# Patient Record
Sex: MALE | Race: WHITE | NOT HISPANIC OR LATINO | Employment: UNEMPLOYED | URBAN - METROPOLITAN AREA
[De-identification: names, ages, dates, MRNs, and addresses within clinical notes are randomized per-mention and may not be internally consistent; named-entity substitution may affect disease eponyms.]

---

## 2017-11-10 ENCOUNTER — CLINICAL SUPPORT (OUTPATIENT)
Dept: OCCUPATIONAL MEDICINE | Facility: CLINIC | Age: 27
End: 2017-11-10

## 2017-11-10 DIAGNOSIS — Z00.00 PHYSICAL EXAM: Primary | ICD-10-CM

## 2017-11-10 LAB
BILIRUB UR QL STRIP: NORMAL
CTP QC/QA: YES
GLUCOSE UR QL STRIP: NORMAL
KETONES UR QL STRIP: NORMAL
LEUKOCYTE ESTERASE UR QL STRIP: NORMAL
PH, POC UA: NORMAL (ref 5–8)
POC 5 PANEL DRUG SCREEN: NEGATIVE
POC BLOOD, URINE: NEGATIVE
POC BREATH ALCOHOL: NEGATIVE
POC NITRATES, URINE: NORMAL
PROT UR QL STRIP: NORMAL
SP GR UR STRIP: NORMAL (ref 1–1.03)
UROBILINOGEN UR STRIP-ACNC: NORMAL (ref 0.3–2.2)

## 2017-11-10 PROCEDURE — 92552 PURE TONE AUDIOMETRY AIR: CPT | Mod: S$GLB,,, | Performed by: PREVENTIVE MEDICINE

## 2017-11-10 PROCEDURE — 80305 DRUG TEST PRSMV DIR OPT OBS: CPT | Mod: S$GLB,,, | Performed by: PREVENTIVE MEDICINE

## 2017-11-10 PROCEDURE — 82075 ASSAY OF BREATH ETHANOL: CPT | Mod: S$GLB,,, | Performed by: NURSE PRACTITIONER

## 2017-11-10 PROCEDURE — 80305 DRUG TEST PRSMV DIR OPT OBS: CPT | Mod: QW,S$GLB,, | Performed by: NURSE PRACTITIONER

## 2017-11-10 PROCEDURE — 94010 BREATHING CAPACITY TEST: CPT | Mod: S$GLB,,, | Performed by: PREVENTIVE MEDICINE

## 2017-11-10 PROCEDURE — 99499 UNLISTED E&M SERVICE: CPT | Mod: S$GLB,,, | Performed by: PREVENTIVE MEDICINE

## 2019-03-14 ENCOUNTER — OCCUPATIONAL HEALTH (OUTPATIENT)
Dept: URGENT CARE | Facility: CLINIC | Age: 29
End: 2019-03-14

## 2019-03-14 DIAGNOSIS — Z02.89 ENCOUNTER FOR PHYSICAL EXAMINATION RELATED TO EMPLOYMENT: Primary | ICD-10-CM

## 2019-03-14 PROCEDURE — 99499 PHYSICAL, BASIC COMPLEXITY: ICD-10-PCS | Mod: S$GLB,,, | Performed by: NURSE PRACTITIONER

## 2019-03-14 PROCEDURE — 71045 X-RAY EXAM CHEST 1 VIEW: CPT | Mod: FY,S$GLB,, | Performed by: RADIOLOGY

## 2019-03-14 PROCEDURE — 94010 PULMONARY FUNCTION SCREENING (OCC MED PHYSICALS): ICD-10-PCS | Mod: S$GLB,,, | Performed by: NURSE PRACTITIONER

## 2019-03-14 PROCEDURE — 99499 UNLISTED E&M SERVICE: CPT | Mod: S$GLB,,, | Performed by: NURSE PRACTITIONER

## 2019-03-14 PROCEDURE — 94010 BREATHING CAPACITY TEST: CPT | Mod: S$GLB,,, | Performed by: NURSE PRACTITIONER

## 2019-03-14 PROCEDURE — 99080 OSHA QUESTIONNAIRE: ICD-10-PCS | Mod: S$GLB,,, | Performed by: NURSE PRACTITIONER

## 2019-03-14 PROCEDURE — 97750 LIFT TEST: ICD-10-PCS | Mod: S$GLB,,, | Performed by: NURSE PRACTITIONER

## 2019-03-14 PROCEDURE — 99080 SPECIAL REPORTS OR FORMS: CPT | Mod: S$GLB,,, | Performed by: NURSE PRACTITIONER

## 2019-03-14 PROCEDURE — 89240 OOH PANEL 2 (CMP, CBC W/DIFF, UA, MICR): ICD-10-PCS | Mod: S$GLB,,, | Performed by: NURSE PRACTITIONER

## 2019-03-14 PROCEDURE — 97750 PHYSICAL PERFORMANCE TEST: CPT | Mod: S$GLB,,, | Performed by: NURSE PRACTITIONER

## 2019-03-14 PROCEDURE — 71045 XR CHEST 1 VIEW: ICD-10-PCS | Mod: FY,S$GLB,, | Performed by: RADIOLOGY

## 2019-03-14 PROCEDURE — 95832 STRENGTH & FLEX: CPT | Mod: S$GLB,,, | Performed by: NURSE PRACTITIONER

## 2019-03-14 PROCEDURE — 99002 N95 MASK FIT: ICD-10-PCS | Mod: S$GLB,,, | Performed by: NURSE PRACTITIONER

## 2019-03-14 PROCEDURE — 72110 X-RAY EXAM L-2 SPINE 4/>VWS: CPT | Mod: FY,S$GLB,, | Performed by: RADIOLOGY

## 2019-03-14 PROCEDURE — 99002 DEVICE HANDLING PHYS/QHP: CPT | Mod: S$GLB,,, | Performed by: NURSE PRACTITIONER

## 2019-03-14 PROCEDURE — 72110 XR LUMBAR SPINE COMPLETE 5 VIEW: ICD-10-PCS | Mod: FY,S$GLB,, | Performed by: RADIOLOGY

## 2019-03-14 PROCEDURE — 95832 STRENGTH & FLEX: ICD-10-PCS | Mod: S$GLB,,, | Performed by: NURSE PRACTITIONER

## 2019-03-14 PROCEDURE — 89240 UNLISTED MISC PATH TEST: CPT | Mod: S$GLB,,, | Performed by: NURSE PRACTITIONER

## 2022-02-04 ENCOUNTER — HISTORICAL (OUTPATIENT)
Dept: ADMINISTRATIVE | Facility: HOSPITAL | Age: 32
End: 2022-02-04

## 2022-04-10 ENCOUNTER — HISTORICAL (OUTPATIENT)
Dept: ADMINISTRATIVE | Facility: HOSPITAL | Age: 32
End: 2022-04-10

## 2022-04-26 VITALS
WEIGHT: 259.5 LBS | BODY MASS INDEX: 36.33 KG/M2 | HEIGHT: 71 IN | OXYGEN SATURATION: 95 % | SYSTOLIC BLOOD PRESSURE: 134 MMHG | DIASTOLIC BLOOD PRESSURE: 78 MMHG

## 2022-07-31 ENCOUNTER — ANESTHESIA (OUTPATIENT)
Dept: SURGERY | Facility: HOSPITAL | Age: 32
DRG: 419 | End: 2022-07-31

## 2022-07-31 ENCOUNTER — HOSPITAL ENCOUNTER (INPATIENT)
Facility: HOSPITAL | Age: 32
LOS: 1 days | Discharge: HOME | DRG: 419 | End: 2022-08-01
Attending: EMERGENCY MEDICINE | Admitting: SURGERY

## 2022-07-31 ENCOUNTER — ANESTHESIA EVENT (OUTPATIENT)
Dept: SURGERY | Facility: HOSPITAL | Age: 32
DRG: 419 | End: 2022-07-31

## 2022-07-31 DIAGNOSIS — R10.9 ABDOMINAL PAIN: ICD-10-CM

## 2022-07-31 DIAGNOSIS — K80.00 CALCULUS OF GALLBLADDER WITH ACUTE CHOLECYSTITIS WITHOUT OBSTRUCTION: Primary | ICD-10-CM

## 2022-07-31 DIAGNOSIS — K80.71 CALCULUS OF GALLBLADDER AND BILE DUCT WITH OBSTRUCTION WITHOUT CHOLECYSTITIS: ICD-10-CM

## 2022-07-31 LAB
ALBUMIN SERPL-MCNC: 4.1 GM/DL (ref 3.5–5)
ALBUMIN/GLOB SERPL: 1.4 RATIO (ref 1.1–2)
ALP SERPL-CCNC: 75 UNIT/L (ref 40–150)
ALT SERPL-CCNC: 34 UNIT/L (ref 0–55)
APPEARANCE UR: CLEAR
AST SERPL-CCNC: 24 UNIT/L (ref 5–34)
BACTERIA #/AREA URNS AUTO: NORMAL /HPF
BASOPHILS # BLD AUTO: 0.03 X10(3)/MCL (ref 0–0.2)
BASOPHILS NFR BLD AUTO: 0.2 %
BILIRUB UR QL STRIP.AUTO: NEGATIVE MG/DL
BILIRUBIN DIRECT+TOT PNL SERPL-MCNC: 0.8 MG/DL
BUN SERPL-MCNC: 8.7 MG/DL (ref 8.9–20.6)
CALCIUM SERPL-MCNC: 9.3 MG/DL (ref 8.4–10.2)
CHLORIDE SERPL-SCNC: 105 MMOL/L (ref 98–107)
CO2 SERPL-SCNC: 23 MMOL/L (ref 22–29)
COLOR UR AUTO: YELLOW
CREAT SERPL-MCNC: 0.87 MG/DL (ref 0.73–1.18)
EOSINOPHIL # BLD AUTO: 0.01 X10(3)/MCL (ref 0–0.9)
EOSINOPHIL NFR BLD AUTO: 0.1 %
ERYTHROCYTE [DISTWIDTH] IN BLOOD BY AUTOMATED COUNT: 12 % (ref 11.5–17)
FLUAV AG UPPER RESP QL IA.RAPID: NOT DETECTED
FLUBV AG UPPER RESP QL IA.RAPID: NOT DETECTED
GLOBULIN SER-MCNC: 3 GM/DL (ref 2.4–3.5)
GLUCOSE SERPL-MCNC: 130 MG/DL (ref 74–100)
GLUCOSE UR QL STRIP.AUTO: ABNORMAL MG/DL
HCT VFR BLD AUTO: 48.3 % (ref 42–52)
HGB BLD-MCNC: 15.8 GM/DL (ref 14–18)
IMM GRANULOCYTES # BLD AUTO: 0.08 X10(3)/MCL (ref 0–0.04)
IMM GRANULOCYTES NFR BLD AUTO: 0.5 %
KETONES UR QL STRIP.AUTO: NEGATIVE MG/DL
LEUKOCYTE ESTERASE UR QL STRIP.AUTO: NEGATIVE UNIT/L
LIPASE SERPL-CCNC: 15 U/L
LYMPHOCYTES # BLD AUTO: 1.33 X10(3)/MCL (ref 0.6–4.6)
LYMPHOCYTES NFR BLD AUTO: 8.1 %
MCH RBC QN AUTO: 29.3 PG (ref 27–31)
MCHC RBC AUTO-ENTMCNC: 32.7 MG/DL (ref 33–36)
MCV RBC AUTO: 89.6 FL (ref 80–94)
MONOCYTES # BLD AUTO: 0.97 X10(3)/MCL (ref 0.1–1.3)
MONOCYTES NFR BLD AUTO: 5.9 %
NEUTROPHILS # BLD AUTO: 14 X10(3)/MCL (ref 2.1–9.2)
NEUTROPHILS NFR BLD AUTO: 85.2 %
NITRITE UR QL STRIP.AUTO: NEGATIVE
NRBC BLD AUTO-RTO: 0 %
PH UR STRIP.AUTO: 7.5 [PH]
PLATELET # BLD AUTO: 245 X10(3)/MCL (ref 130–400)
PMV BLD AUTO: 10.3 FL (ref 7.4–10.4)
POTASSIUM SERPL-SCNC: 3.6 MMOL/L (ref 3.5–5.1)
PROT SERPL-MCNC: 7.1 GM/DL (ref 6.4–8.3)
PROT UR QL STRIP.AUTO: NEGATIVE MG/DL
RBC # BLD AUTO: 5.39 X10(6)/MCL (ref 4.7–6.1)
RBC #/AREA URNS AUTO: <5 /HPF
RBC UR QL AUTO: NEGATIVE UNIT/L
SARS-COV-2 RNA RESP QL NAA+PROBE: NOT DETECTED
SODIUM SERPL-SCNC: 139 MMOL/L (ref 136–145)
SP GR UR STRIP.AUTO: 1.02 (ref 1–1.03)
SQUAMOUS #/AREA URNS AUTO: <5 /HPF
TROPONIN I SERPL-MCNC: <0.01 NG/ML (ref 0–0.04)
UROBILINOGEN UR STRIP-ACNC: 1 MG/DL
WBC # SPEC AUTO: 16.4 X10(3)/MCL (ref 4.5–11.5)
WBC #/AREA URNS AUTO: <5 /HPF

## 2022-07-31 PROCEDURE — 71000016 HC POSTOP RECOV ADDL HR: Performed by: SURGERY

## 2022-07-31 PROCEDURE — 25000003 PHARM REV CODE 250

## 2022-07-31 PROCEDURE — 99285 EMERGENCY DEPT VISIT HI MDM: CPT | Mod: 25

## 2022-07-31 PROCEDURE — 83690 ASSAY OF LIPASE: CPT | Performed by: EMERGENCY MEDICINE

## 2022-07-31 PROCEDURE — 96361 HYDRATE IV INFUSION ADD-ON: CPT

## 2022-07-31 PROCEDURE — 37000008 HC ANESTHESIA 1ST 15 MINUTES: Performed by: SURGERY

## 2022-07-31 PROCEDURE — 25500020 PHARM REV CODE 255: Performed by: EMERGENCY MEDICINE

## 2022-07-31 PROCEDURE — 63600175 PHARM REV CODE 636 W HCPCS

## 2022-07-31 PROCEDURE — 71000015 HC POSTOP RECOV 1ST HR: Performed by: SURGERY

## 2022-07-31 PROCEDURE — 71000033 HC RECOVERY, INTIAL HOUR: Performed by: SURGERY

## 2022-07-31 PROCEDURE — 36000709 HC OR TIME LEV III EA ADD 15 MIN: Performed by: SURGERY

## 2022-07-31 PROCEDURE — 93005 ELECTROCARDIOGRAM TRACING: CPT

## 2022-07-31 PROCEDURE — 93010 ELECTROCARDIOGRAM REPORT: CPT | Mod: ,,, | Performed by: INTERNAL MEDICINE

## 2022-07-31 PROCEDURE — 25000003 PHARM REV CODE 250: Performed by: SURGERY

## 2022-07-31 PROCEDURE — 87636 SARSCOV2 & INF A&B AMP PRB: CPT | Performed by: EMERGENCY MEDICINE

## 2022-07-31 PROCEDURE — 11000001 HC ACUTE MED/SURG PRIVATE ROOM

## 2022-07-31 PROCEDURE — 71000039 HC RECOVERY, EACH ADD'L HOUR: Performed by: SURGERY

## 2022-07-31 PROCEDURE — 63600175 PHARM REV CODE 636 W HCPCS: Performed by: SURGERY

## 2022-07-31 PROCEDURE — 96375 TX/PRO/DX INJ NEW DRUG ADDON: CPT

## 2022-07-31 PROCEDURE — 96374 THER/PROPH/DIAG INJ IV PUSH: CPT

## 2022-07-31 PROCEDURE — 63600175 PHARM REV CODE 636 W HCPCS: Performed by: EMERGENCY MEDICINE

## 2022-07-31 PROCEDURE — 85025 COMPLETE CBC W/AUTO DIFF WBC: CPT | Performed by: EMERGENCY MEDICINE

## 2022-07-31 PROCEDURE — 36000708 HC OR TIME LEV III 1ST 15 MIN: Performed by: SURGERY

## 2022-07-31 PROCEDURE — 84484 ASSAY OF TROPONIN QUANT: CPT | Performed by: EMERGENCY MEDICINE

## 2022-07-31 PROCEDURE — 36415 COLL VENOUS BLD VENIPUNCTURE: CPT | Performed by: EMERGENCY MEDICINE

## 2022-07-31 PROCEDURE — 27201423 OPTIME MED/SURG SUP & DEVICES STERILE SUPPLY: Performed by: SURGERY

## 2022-07-31 PROCEDURE — 25000003 PHARM REV CODE 250: Performed by: EMERGENCY MEDICINE

## 2022-07-31 PROCEDURE — 93010 EKG 12-LEAD: ICD-10-PCS | Mod: ,,, | Performed by: INTERNAL MEDICINE

## 2022-07-31 PROCEDURE — 80053 COMPREHEN METABOLIC PANEL: CPT | Performed by: EMERGENCY MEDICINE

## 2022-07-31 PROCEDURE — 96376 TX/PRO/DX INJ SAME DRUG ADON: CPT

## 2022-07-31 PROCEDURE — 81001 URINALYSIS AUTO W/SCOPE: CPT | Performed by: EMERGENCY MEDICINE

## 2022-07-31 PROCEDURE — 37000009 HC ANESTHESIA EA ADD 15 MINS: Performed by: SURGERY

## 2022-07-31 RX ORDER — LIDOCAINE HYDROCHLORIDE 20 MG/ML
INJECTION, SOLUTION EPIDURAL; INFILTRATION; INTRACAUDAL; PERINEURAL
Status: DISCONTINUED | OUTPATIENT
Start: 2022-07-31 | End: 2022-07-31

## 2022-07-31 RX ORDER — BUPIVACAINE HYDROCHLORIDE 5 MG/ML
INJECTION, SOLUTION EPIDURAL; INTRACAUDAL
Status: DISCONTINUED | OUTPATIENT
Start: 2022-07-31 | End: 2022-07-31 | Stop reason: HOSPADM

## 2022-07-31 RX ORDER — SUCCINYLCHOLINE CHLORIDE 20 MG/ML
INJECTION INTRAMUSCULAR; INTRAVENOUS
Status: DISCONTINUED | OUTPATIENT
Start: 2022-07-31 | End: 2022-07-31

## 2022-07-31 RX ORDER — PROMETHAZINE HYDROCHLORIDE 25 MG/1
25 SUPPOSITORY RECTAL EVERY 6 HOURS PRN
Status: DISCONTINUED | OUTPATIENT
Start: 2022-07-31 | End: 2022-07-31

## 2022-07-31 RX ORDER — OXYCODONE HYDROCHLORIDE 5 MG/1
10 TABLET ORAL EVERY 4 HOURS PRN
Status: DISCONTINUED | OUTPATIENT
Start: 2022-07-31 | End: 2022-08-01 | Stop reason: HOSPADM

## 2022-07-31 RX ORDER — ONDANSETRON 2 MG/ML
4 INJECTION INTRAMUSCULAR; INTRAVENOUS EVERY 8 HOURS PRN
Status: DISCONTINUED | OUTPATIENT
Start: 2022-07-31 | End: 2022-08-01 | Stop reason: HOSPADM

## 2022-07-31 RX ORDER — DIPHENHYDRAMINE HYDROCHLORIDE 50 MG/ML
25 INJECTION INTRAMUSCULAR; INTRAVENOUS EVERY 6 HOURS PRN
Status: CANCELLED | OUTPATIENT
Start: 2022-07-31

## 2022-07-31 RX ORDER — OXYCODONE HYDROCHLORIDE 5 MG/1
5 TABLET ORAL EVERY 4 HOURS PRN
Status: DISCONTINUED | OUTPATIENT
Start: 2022-07-31 | End: 2022-08-01 | Stop reason: HOSPADM

## 2022-07-31 RX ORDER — SODIUM CHLORIDE, SODIUM GLUCONATE, SODIUM ACETATE, POTASSIUM CHLORIDE AND MAGNESIUM CHLORIDE 30; 37; 368; 526; 502 MG/100ML; MG/100ML; MG/100ML; MG/100ML; MG/100ML
1000 INJECTION, SOLUTION INTRAVENOUS CONTINUOUS
Status: CANCELLED | OUTPATIENT
Start: 2022-07-31 | End: 2022-08-30

## 2022-07-31 RX ORDER — PROPOFOL 10 MG/ML
VIAL (ML) INTRAVENOUS
Status: DISCONTINUED | OUTPATIENT
Start: 2022-07-31 | End: 2022-07-31

## 2022-07-31 RX ORDER — MORPHINE SULFATE 4 MG/ML
4 INJECTION, SOLUTION INTRAMUSCULAR; INTRAVENOUS
Status: COMPLETED | OUTPATIENT
Start: 2022-07-31 | End: 2022-07-31

## 2022-07-31 RX ORDER — MIDAZOLAM HYDROCHLORIDE 1 MG/ML
INJECTION INTRAMUSCULAR; INTRAVENOUS
Status: DISCONTINUED | OUTPATIENT
Start: 2022-07-31 | End: 2022-07-31

## 2022-07-31 RX ORDER — MEPERIDINE HYDROCHLORIDE 25 MG/ML
12.5 INJECTION INTRAMUSCULAR; INTRAVENOUS; SUBCUTANEOUS EVERY 10 MIN PRN
Status: CANCELLED | OUTPATIENT
Start: 2022-07-31 | End: 2022-08-01

## 2022-07-31 RX ORDER — IPRATROPIUM BROMIDE AND ALBUTEROL SULFATE 2.5; .5 MG/3ML; MG/3ML
3 SOLUTION RESPIRATORY (INHALATION)
Status: CANCELLED | OUTPATIENT
Start: 2022-07-31

## 2022-07-31 RX ORDER — MIDAZOLAM HYDROCHLORIDE 1 MG/ML
2 INJECTION INTRAMUSCULAR; INTRAVENOUS
Status: CANCELLED | OUTPATIENT
Start: 2022-07-31 | End: 2022-07-31

## 2022-07-31 RX ORDER — KETOROLAC TROMETHAMINE 30 MG/ML
15 INJECTION, SOLUTION INTRAMUSCULAR; INTRAVENOUS EVERY 6 HOURS
Status: DISCONTINUED | OUTPATIENT
Start: 2022-07-31 | End: 2022-07-31

## 2022-07-31 RX ORDER — ONDANSETRON 2 MG/ML
4 INJECTION INTRAMUSCULAR; INTRAVENOUS EVERY 12 HOURS PRN
Status: DISCONTINUED | OUTPATIENT
Start: 2022-07-31 | End: 2022-07-31

## 2022-07-31 RX ORDER — SODIUM CITRATE AND CITRIC ACID MONOHYDRATE 334; 500 MG/5ML; MG/5ML
30 SOLUTION ORAL ONCE
Status: CANCELLED | OUTPATIENT
Start: 2022-07-31 | End: 2022-07-31

## 2022-07-31 RX ORDER — ROCURONIUM BROMIDE 10 MG/ML
INJECTION, SOLUTION INTRAVENOUS
Status: DISCONTINUED | OUTPATIENT
Start: 2022-07-31 | End: 2022-07-31

## 2022-07-31 RX ORDER — KETOROLAC TROMETHAMINE 30 MG/ML
INJECTION, SOLUTION INTRAMUSCULAR; INTRAVENOUS
Status: DISCONTINUED | OUTPATIENT
Start: 2022-07-31 | End: 2022-07-31

## 2022-07-31 RX ORDER — SODIUM CHLORIDE 0.9 % (FLUSH) 0.9 %
10 SYRINGE (ML) INJECTION
Status: DISCONTINUED | OUTPATIENT
Start: 2022-07-31 | End: 2022-07-31

## 2022-07-31 RX ORDER — TALC
6 POWDER (GRAM) TOPICAL NIGHTLY PRN
Status: DISCONTINUED | OUTPATIENT
Start: 2022-07-31 | End: 2022-07-31

## 2022-07-31 RX ORDER — ONDANSETRON 2 MG/ML
INJECTION INTRAMUSCULAR; INTRAVENOUS
Status: DISCONTINUED | OUTPATIENT
Start: 2022-07-31 | End: 2022-07-31

## 2022-07-31 RX ORDER — HYDROMORPHONE HYDROCHLORIDE 2 MG/ML
0.2 INJECTION, SOLUTION INTRAMUSCULAR; INTRAVENOUS; SUBCUTANEOUS EVERY 5 MIN PRN
Status: CANCELLED | OUTPATIENT
Start: 2022-07-31

## 2022-07-31 RX ORDER — DEXAMETHASONE SODIUM PHOSPHATE 4 MG/ML
INJECTION, SOLUTION INTRA-ARTICULAR; INTRALESIONAL; INTRAMUSCULAR; INTRAVENOUS; SOFT TISSUE
Status: DISCONTINUED | OUTPATIENT
Start: 2022-07-31 | End: 2022-07-31

## 2022-07-31 RX ORDER — PIPERACILLIN SODIUM, TAZOBACTAM SODIUM 4; .5 G/20ML; G/20ML
INJECTION, POWDER, LYOPHILIZED, FOR SOLUTION INTRAVENOUS
Status: DISPENSED
Start: 2022-07-31 | End: 2022-07-31

## 2022-07-31 RX ORDER — ACETAMINOPHEN 325 MG/1
650 TABLET ORAL EVERY 8 HOURS PRN
Status: DISCONTINUED | OUTPATIENT
Start: 2022-07-31 | End: 2022-07-31

## 2022-07-31 RX ORDER — LIDOCAINE HYDROCHLORIDE 10 MG/ML
1 INJECTION, SOLUTION EPIDURAL; INFILTRATION; INTRACAUDAL; PERINEURAL ONCE
Status: CANCELLED | OUTPATIENT
Start: 2022-07-31 | End: 2022-07-31

## 2022-07-31 RX ORDER — CEFAZOLIN SODIUM 1 G/3ML
INJECTION, POWDER, FOR SOLUTION INTRAMUSCULAR; INTRAVENOUS
Status: DISCONTINUED | OUTPATIENT
Start: 2022-07-31 | End: 2022-07-31

## 2022-07-31 RX ORDER — ENOXAPARIN SODIUM 100 MG/ML
40 INJECTION SUBCUTANEOUS EVERY 24 HOURS
Status: DISCONTINUED | OUTPATIENT
Start: 2022-07-31 | End: 2022-08-01 | Stop reason: HOSPADM

## 2022-07-31 RX ORDER — PROCHLORPERAZINE EDISYLATE 5 MG/ML
5 INJECTION INTRAMUSCULAR; INTRAVENOUS EVERY 30 MIN PRN
Status: CANCELLED | OUTPATIENT
Start: 2022-07-31

## 2022-07-31 RX ORDER — ACETAMINOPHEN 10 MG/ML
INJECTION, SOLUTION INTRAVENOUS
Status: DISCONTINUED | OUTPATIENT
Start: 2022-07-31 | End: 2022-07-31

## 2022-07-31 RX ORDER — ONDANSETRON 2 MG/ML
4 INJECTION INTRAMUSCULAR; INTRAVENOUS
Status: COMPLETED | OUTPATIENT
Start: 2022-07-31 | End: 2022-07-31

## 2022-07-31 RX ORDER — KETOROLAC TROMETHAMINE 30 MG/ML
15 INJECTION, SOLUTION INTRAMUSCULAR; INTRAVENOUS EVERY 6 HOURS
Status: DISCONTINUED | OUTPATIENT
Start: 2022-07-31 | End: 2022-08-01 | Stop reason: HOSPADM

## 2022-07-31 RX ORDER — ONDANSETRON 2 MG/ML
4 INJECTION INTRAMUSCULAR; INTRAVENOUS DAILY PRN
Status: CANCELLED | OUTPATIENT
Start: 2022-07-31

## 2022-07-31 RX ORDER — MORPHINE SULFATE 10 MG/ML
2 INJECTION INTRAMUSCULAR; INTRAVENOUS; SUBCUTANEOUS EVERY 4 HOURS PRN
Status: DISCONTINUED | OUTPATIENT
Start: 2022-07-31 | End: 2022-07-31

## 2022-07-31 RX ORDER — ACETAMINOPHEN 325 MG/1
650 TABLET ORAL EVERY 6 HOURS
Status: DISCONTINUED | OUTPATIENT
Start: 2022-07-31 | End: 2022-08-01 | Stop reason: HOSPADM

## 2022-07-31 RX ORDER — HYDROMORPHONE HYDROCHLORIDE 2 MG/ML
1 INJECTION, SOLUTION INTRAMUSCULAR; INTRAVENOUS; SUBCUTANEOUS
Status: COMPLETED | OUTPATIENT
Start: 2022-07-31 | End: 2022-07-31

## 2022-07-31 RX ORDER — SODIUM CHLORIDE 9 MG/ML
INJECTION, SOLUTION INTRAVENOUS CONTINUOUS
Status: DISCONTINUED | OUTPATIENT
Start: 2022-07-31 | End: 2022-08-01 | Stop reason: HOSPADM

## 2022-07-31 RX ORDER — KETOROLAC TROMETHAMINE 30 MG/ML
30 INJECTION, SOLUTION INTRAMUSCULAR; INTRAVENOUS ONCE
Status: DISCONTINUED | OUTPATIENT
Start: 2022-07-31 | End: 2022-07-31

## 2022-07-31 RX ORDER — FENTANYL CITRATE 50 UG/ML
INJECTION, SOLUTION INTRAMUSCULAR; INTRAVENOUS
Status: DISCONTINUED | OUTPATIENT
Start: 2022-07-31 | End: 2022-07-31

## 2022-07-31 RX ADMIN — ROCURONIUM BROMIDE 35 MG: 10 SOLUTION INTRAVENOUS at 12:07

## 2022-07-31 RX ADMIN — SODIUM CHLORIDE: 9 INJECTION, SOLUTION INTRAVENOUS at 09:07

## 2022-07-31 RX ADMIN — ENOXAPARIN SODIUM 40 MG: 100 INJECTION SUBCUTANEOUS at 06:07

## 2022-07-31 RX ADMIN — KETOROLAC TROMETHAMINE 30 MG: 30 INJECTION, SOLUTION INTRAMUSCULAR at 01:07

## 2022-07-31 RX ADMIN — ONDANSETRON 4 MG: 2 INJECTION INTRAMUSCULAR; INTRAVENOUS at 02:07

## 2022-07-31 RX ADMIN — PROPOFOL 200 MG: 10 INJECTION, EMULSION INTRAVENOUS at 11:07

## 2022-07-31 RX ADMIN — LIDOCAINE HYDROCHLORIDE 50 MG: 20 INJECTION, SOLUTION EPIDURAL; INFILTRATION; INTRACAUDAL; PERINEURAL at 11:07

## 2022-07-31 RX ADMIN — GLYCOPYRROLATE 0.2 MG: 0.2 INJECTION, SOLUTION INTRAMUSCULAR; INTRAVENOUS at 11:07

## 2022-07-31 RX ADMIN — ROCURONIUM BROMIDE 10 MG: 10 SOLUTION INTRAVENOUS at 12:07

## 2022-07-31 RX ADMIN — FENTANYL CITRATE 100 MCG: 50 INJECTION, SOLUTION INTRAMUSCULAR; INTRAVENOUS at 11:07

## 2022-07-31 RX ADMIN — ONDANSETRON 4 MG: 2 INJECTION INTRAMUSCULAR; INTRAVENOUS at 01:07

## 2022-07-31 RX ADMIN — IOPAMIDOL 100 ML: 755 INJECTION, SOLUTION INTRAVENOUS at 04:07

## 2022-07-31 RX ADMIN — ACETAMINOPHEN 325MG 650 MG: 325 TABLET ORAL at 06:07

## 2022-07-31 RX ADMIN — ACETAMINOPHEN 1000 MG: 10 INJECTION, SOLUTION INTRAVENOUS at 12:07

## 2022-07-31 RX ADMIN — DEXAMETHASONE SODIUM PHOSPHATE 4 MG: 4 INJECTION, SOLUTION INTRA-ARTICULAR; INTRALESIONAL; INTRAMUSCULAR; INTRAVENOUS; SOFT TISSUE at 12:07

## 2022-07-31 RX ADMIN — SODIUM CHLORIDE 1000 ML: 9 INJECTION, SOLUTION INTRAVENOUS at 02:07

## 2022-07-31 RX ADMIN — SUCCINYLCHOLINE CHLORIDE 160 MG: 20 INJECTION, SOLUTION INTRAMUSCULAR; INTRAVENOUS at 11:07

## 2022-07-31 RX ADMIN — FENTANYL CITRATE 50 MCG: 50 INJECTION, SOLUTION INTRAMUSCULAR; INTRAVENOUS at 01:07

## 2022-07-31 RX ADMIN — MORPHINE SULFATE 4 MG: 4 INJECTION INTRAVENOUS at 02:07

## 2022-07-31 RX ADMIN — MORPHINE SULFATE 4 MG: 4 INJECTION INTRAVENOUS at 08:07

## 2022-07-31 RX ADMIN — SODIUM CHLORIDE, SODIUM GLUCONATE, SODIUM ACETATE, POTASSIUM CHLORIDE AND MAGNESIUM CHLORIDE: 526; 502; 368; 37; 30 INJECTION, SOLUTION INTRAVENOUS at 11:07

## 2022-07-31 RX ADMIN — ACETAMINOPHEN 325MG 650 MG: 325 TABLET ORAL at 11:07

## 2022-07-31 RX ADMIN — KETOROLAC TROMETHAMINE 15 MG: 30 INJECTION, SOLUTION INTRAMUSCULAR; INTRAVENOUS at 11:07

## 2022-07-31 RX ADMIN — HYDROMORPHONE HYDROCHLORIDE 1 MG: 2 INJECTION, SOLUTION INTRAMUSCULAR; INTRAVENOUS; SUBCUTANEOUS at 03:07

## 2022-07-31 RX ADMIN — ROCURONIUM BROMIDE 5 MG: 10 SOLUTION INTRAVENOUS at 11:07

## 2022-07-31 RX ADMIN — MIDAZOLAM 2 MG: 1 INJECTION INTRAMUSCULAR; INTRAVENOUS at 11:07

## 2022-07-31 RX ADMIN — KETOROLAC TROMETHAMINE 15 MG: 30 INJECTION, SOLUTION INTRAMUSCULAR; INTRAVENOUS at 06:07

## 2022-07-31 RX ADMIN — SUGAMMADEX 200 MG: 100 INJECTION, SOLUTION INTRAVENOUS at 01:07

## 2022-07-31 RX ADMIN — PIPERACILLIN AND TAZOBACTAM 4.5 G: 4; .5 INJECTION, POWDER, LYOPHILIZED, FOR SOLUTION INTRAVENOUS; PARENTERAL at 12:07

## 2022-07-31 RX ADMIN — CEFAZOLIN 2 G: 330 INJECTION, POWDER, FOR SOLUTION INTRAMUSCULAR; INTRAVENOUS at 12:07

## 2022-07-31 NOTE — ANESTHESIA POSTPROCEDURE EVALUATION
Anesthesia Post Evaluation    Patient: Samir Zazueta    Procedure(s) Performed: Procedure(s) (LRB):  CHOLECYSTECTOMY, LAPAROSCOPIC (N/A)    Final Anesthesia Type: general      Patient location during evaluation: PACU  Patient participation: Yes- Able to Participate  Level of consciousness: awake and alert and oriented  Post-procedure vital signs: reviewed and stable  Pain management: adequate  Airway patency: patent    PONV status at discharge: No PONV  Anesthetic complications: no      Cardiovascular status: hemodynamically stable  Respiratory status: unassisted  Hydration status: euvolemic  Follow-up not needed.          Vitals Value Taken Time   /98 07/31/22 1510   Temp 98.0 07/31/22 1518   Pulse 75 07/31/22 1518   Resp 11 07/31/22 1518   SpO2 98 % 07/31/22 1518   Vitals shown include unvalidated device data.      Event Time   Out of Recovery 15:14:50         Pain/Michael Score: Pain Rating Prior to Med Admin: 10 (7/31/2022  8:45 AM)  Michael Score: 9 (7/31/2022  3:15 PM)

## 2022-07-31 NOTE — ED PROVIDER NOTES
Encounter Date: 7/31/2022    SCRIBE #1 NOTE: I, Satnam Jones am scribing for, and in the presence of,  Sam Corrales MD. I have scribed the following portions of the note - Other sections scribed: HPI, ROS, PE.       History     Chief Complaint   Patient presents with    Abdominal Pain    Vomiting    Nausea     Complaint of upper abdominal pain, with nausea, vomiting.  Onset 2100 last night.  Was [place] at [facility] [place], [place] A[place]     32 y/o male presents to ED c/o upper abdominal pain onset 2100 yesterday. Patient report the pain starts in his RUQ and radiates to LUQ. PT notes associated symptoms of nausea and vomiting. Pt reports no history of abdominal surgery    The history is provided by the patient and a significant other. No  was used.   Abdominal Pain  The current episode started several hours ago. The abdominal pain is located in the RUQ. The abdominal pain radiates to the LUQ. The other symptoms of the illness include nausea and vomiting. The other symptoms of the illness do not include fever, shortness of breath, diarrhea or dysuria.   Nausea began yesterday.   The vomiting began yesterday.   Symptoms associated with the illness do not include chills, constipation, frequency or back pain.   Vomiting   Associated symptoms include abdominal pain (Upper). Pertinent negatives include no chills, no cough, no diarrhea, no fever and no headaches.   Nausea  Associated symptoms include abdominal pain (Upper). Pertinent negatives include no chest pain, no headaches and no shortness of breath.     Review of patient's allergies indicates:  No Known Allergies  No past medical history on file.  Past Surgical History:   Procedure Laterality Date    LAPAROSCOPIC CHOLECYSTECTOMY N/A 7/31/2022    Procedure: CHOLECYSTECTOMY, LAPAROSCOPIC;  Surgeon: Jai Kitchen Jr., MD;  Location: [facility];  Service: General;  Laterality: N/A;     No family history on file.     Review of Systems    Constitutional: Negative for chills and fever.   HENT: Negative for congestion and ear pain.    Eyes: Negative for discharge.   Respiratory: Negative for cough, shortness of breath and wheezing.    Cardiovascular: Negative for chest pain and leg swelling.   Gastrointestinal: Positive for abdominal pain (Upper), nausea and vomiting. Negative for constipation and diarrhea.   Genitourinary: Negative for dysuria, flank pain and frequency.   Musculoskeletal: Negative for back pain and joint swelling.   Skin: Negative for rash.   Neurological: Negative for dizziness, weakness and headaches.   Psychiatric/Behavioral: Negative for agitation, confusion and hallucinations.       Physical Exam     Initial Vitals [07/31/22 0148]   BP Pulse Resp Temp SpO2   (!) 158/101 71 20 97.7 °F (36.5 °C) 100 %      MAP       --         Physical Exam    Nursing note and vitals reviewed.  Constitutional: He appears well-developed and well-nourished. No distress.   HENT:   Head: Normocephalic and atraumatic.   Eyes: Conjunctivae and EOM are normal. Pupils are equal, round, and reactive to light. Right eye exhibits no discharge. Left eye exhibits no discharge. No scleral icterus.   Neck: No tracheal deviation present.   Cardiovascular: Normal rate, regular rhythm, normal heart sounds and intact distal pulses.   No murmur heard.  Pulmonary/Chest: Breath sounds normal. No stridor. No respiratory distress. He has no wheezes. He has no rales.   Abdominal: Abdomen is soft. He exhibits no distension. There is abdominal tenderness (Moderate, diffuse). There is no rebound and no guarding.   Musculoskeletal:         General: No tenderness or edema. Normal range of motion.     Neurological: He is alert and oriented to person, place, and time. He has normal strength and normal reflexes. No cranial nerve deficit.   Skin: Skin is warm and dry. No rash noted. No erythema. No pallor.   Psychiatric:   Anxious          ED Course   Procedures  Labs Reviewed    COMPREHENSIVE METABOLIC PANEL - Abnormal; Notable for the following components:       Result Value    Glucose Level 130 (*)     Blood Urea Nitrogen 8.7 (*)     All other components within normal limits   URINALYSIS, REFLEX TO URINE CULTURE - Abnormal; Notable for the following components:    Glucose, UA 2+ (*)     All other components within normal limits   CBC WITH DIFFERENTIAL - Abnormal; Notable for the following components:    WBC 16.4 (*)     MCHC 32.7 (*)     Neut # 14.0 (*)     IG# 0.08 (*)     All other components within normal limits   TROPONIN I - Normal   LIPASE - Normal   COVID/FLU A&B PCR - Normal   URINALYSIS, MICROSCOPIC - Normal   CBC W/ AUTO DIFFERENTIAL    Narrative:     The following orders were created for panel order CBC auto differential.  Procedure                               Abnormality         Status                     ---------                               -----------         ------                     CBC with Differential[[id number]]        Abnormal            Final result                 Please view results for these tests on the individual orders.        ECG Results          EKG 12-lead (Final result)  Result time 07/31/22 09:36:43    Final result by Interface, Lab In [facility] (07/31/22 09:36:43)                 Narrative:    Test Reason : R10.9,    Vent. Rate : 069 BPM     Atrial Rate : 075 BPM     P-R Int : 144 ms          QRS Dur : 096 ms      QT Int : 404 ms       P-R-T Axes : 060 064 010 degrees     QTc Int : 432 ms    Normal sinus rhythm with sinus arrhythmia  Confirmed by Roel KATZ, Amish ([id number]) on 7/31/2022 9:36:35 AM    Referred By:             Confirmed By:Amish Sevilla MD                            Imaging Results          US Abdomen Limited_Gallbladder (Final result)  Result time 07/31/22 12:00:40    Final result by Valentín Anderson MD (07/31/22 12:00:40)                 Impression:      1.  Cholelithiasis with no clear evidence of cholecystitis clinical correlation is  suggested    2. Details and other findings as above    .      Electronically signed by: Valentín Anderson  Date:    07/31/2022  Time:    12:00             Narrative:    EXAMINATION:  US ABDOMEN LIMITED_GALLBLADDER    CLINICAL HISTORY:  dilated gb on ct, abd pain, leukocytosis;    TECHNIQUE:  Limited ultrasound of the right upper quadrant of the abdomen focused on the biliary system was performed.    COMPARISON:  None    FINDINGS:  Liver: Unremarkable appearing liver which measures 18 cm in greatest dimension. No intrahepatic or extrahepatic duct dilatation is seen.    Biliary ducts:    Billiary ducts: The common bile duct measures 3 mm in diameter.    Gallbladder: The gallbladder is full but not distendedand contains multiple tiny stones butotherwise appears unremarkable with no gallbladder wall thickening (2.2 mm) and no pericholecystic fluid and a negative sonographic Pablo's sign. The technologist does note possible subtle hyper vascularity around the gallbladder.    Portal vein: Flow parameters are within normal limits with hepatopetal flow.                    Preliminary result by Interface, Rad Results In (07/31/22 06:41:37)                 Narrative:    START OF REPORT:  Technique: Limited abdominal ultrasound of the liver and gallbladder.    Comparison: None.    Clinical history: Abd pain Assess GB.    Findings:  Liver: Unremarkable appearing liver which measures 18 cm in greatest dimension. No intrahepatic or extrahepatic duct dilatation is seen.  Biliary ducts:  Billiary ducts: The common bile duct measures 3 mm in diameter.  Gallbladder: The gallbladder is full but not distendedand contains multiple tiny stones butotherwise appears unremarkable with no gallbladder wall thickening (2.2 mm) and no pericholecystic fluid and a negative sonographic Pablo's sign. The technologist does note possible subtle hyper vascularity around the gallbladder.  Portal vein: Flow parameters are within normal limits with  hepatopetal flow.      Impression:  1. The gallbladder is full but not distendedand contains multiple tiny stones butotherwise appears unremarkable with no gallbladder wall thickening (2.2 mm) and no pericholecystic fluid and a negative sonographic Pablo's sign. The technologist does note possible subtle hyper vascularity around the gallbladder. Correlate with clinical and laboratory findings as cholecystitis is not entirely excluded.  2. Details and other findings as above.                      Preliminary result by Valentín Anderson MD (07/31/22 06:41:37)                 Narrative:    START OF REPORT:  Technique: Limited abdominal ultrasound of the liver and gallbladder.    Comparison: None.    Clinical history: Abd pain Assess GB.    Findings:  Liver: Unremarkable appearing liver which measures 18 cm in greatest dimension. No intrahepatic or extrahepatic duct dilatation is seen.  Biliary ducts:  Billiary ducts: The common bile duct measures 3 mm in diameter.  Gallbladder: The gallbladder is full but not distendedand contains multiple tiny stones butotherwise appears unremarkable with no gallbladder wall thickening (2.2 mm) and no pericholecystic fluid and a negative sonographic Pablo's sign. The technologist does note possible subtle hyper vascularity around the gallbladder.  Portal vein: Flow parameters are within normal limits with hepatopetal flow.      Impression:  1. The gallbladder is full but not distendedand contains multiple tiny stones butotherwise appears unremarkable with no gallbladder wall thickening (2.2 mm) and no pericholecystic fluid and a negative sonographic Pablo's sign. The technologist does note possible subtle hyper vascularity around the gallbladder. Correlate with clinical and laboratory findings as cholecystitis is not entirely excluded.  2. Details and other findings as above.                                 CT Abdomen Pelvis With Contrast (Final result)  Result time 07/31/22  07:10:47    Final result by Randolph Yang MD (07/31/22 07:10:47)                 Impression:      No acute process identified.    No significant discrepancy between my interpretation and the preliminary radiology report.      Electronically signed by: Randolhp Yang  Date:    07/31/2022  Time:    07:10             Narrative:    EXAMINATION:  CT ABDOMEN PELVIS WITH CONTRAST    CLINICAL HISTORY:  Abdominal pain, acute, nonlocalized;    TECHNIQUE:  CT imaging of the abdomen and pelvis after the administration of intravenous contrast. Dose length product is 474 mGycm. Automatic exposure control, adjustment of mA/kV or iterative reconstruction technique used to limit radiation dose.    COMPARISON:  No relevant comparison studies available at the time of dictation.    FINDINGS:  Liver/biliary: Small site of focal hepatic fat near the falciform ligament.  Gallbladder mildly distended.  No radiodense gallstone or biliary dilatation.    Pancreas: Normal.    Spleen: Normal.    Adrenals: Normal.    Genitourinary: Symmetric renal enhancement. No hydronephrosis. Bladder within normal limits.    Stomach/bowel: No evidence of bowel obstruction. Normal appendix. No definitive sites of bowel inflammation.    Lymph nodes: No pathologically enlarged lymph node identified.    Peritoneum: No ascites or free air. No fluid collection.    Vasculature: Normal abdominal aortic caliber.    Abdominal wall: Normal.    Lung bases: No consolidation or pleural effusion.    Musculoskeletal: No acute osseous findings.                    Preliminary result by [employer], Rad Results In (07/31/22 04:25:43)                 Narrative:    START OF REPORT:  Technique: CT of the abdomen and pelvis was performed with axial images as well as sagittal and coronal reconstruction images with intravenous contrast.    Comparison: None available.    Clinical History: Upper abdominal pain, with nausea, vomiting.    Dosage Information: Automated Exposure Control  was utilized 474.39 mGy.cm.    Findings:  Thorax:  Lungs: The visualized lung bases appear unremarkable.  Pleura: No effusions or thickening are seen.  Heart: The heart size is within normal limits.  Abdomen:  Abdominal Wall: No abdominal wall pathology is seen.  Liver: The liver appears unremarkable.  Biliary System: No intrahepatic or extrahepatic biliary duct dilatation is seen.  Gallbladder: The gallbladder is distended but otherwise appears unremarkable with no stones wall thickening or pericholecystic fluid or inflammatory change.  Pancreas: The pancreas appears unremarkable.  Spleen: The spleen appears unremarkable.  Adrenals: The adrenal glands appear unremarkable.  Kidneys: The kidneys appear unremarkable with no stones cysts masses or hydronephrosis.  Aorta: The abdominal aorta appears unremarkable.  IVC: Unremarkable.  Bowel:  Esophagus: The visualized esophagus appears unremarkable.  Stomach: The stomach appears unremarkable.  Duodenum: Unremarkable appearing duodenum.  Small Bowel: The small bowel appears unremarkable.  Colon: Nondistended.  Appendix: The appendix appears unremarkable.  Peritoneum: No intraperitoneal free air or ascites is seen.    Pelvis:  Bladder: The bladder appears unremarkable.  Male:  Prostate gland: The prostate gland appears unremarkable.    Bony structures:  Dorsal Spine: The visualized dorsal spine appears unremarkable.      Impression:  1. The gallbladder is distended but otherwise appears unremarkable with no stones wall thickening or pericholecystic fluid or inflammatory change. Correlate with clinical and laboratory findings as regards additional evaluation and follow-up.  2. No acute intraabdominal or pelvic solid organ or bowel pathology identified. Details and other findings as discussed above.                      Preliminary result by Randolph Yang MD (07/31/22 04:25:43)                 Narrative:    START OF REPORT:  Technique: CT of the abdomen and pelvis was  performed with axial images as well as sagittal and coronal reconstruction images with intravenous contrast.    Comparison: None available.    Clinical History: Upper abdominal pain, with nausea, vomiting.    Dosage Information: Automated Exposure Control was utilized 474.39 mGy.cm.    Findings:  Thorax:  Lungs: The visualized lung bases appear unremarkable.  Pleura: No effusions or thickening are seen.  Heart: The heart size is within normal limits.  Abdomen:  Abdominal Wall: No abdominal wall pathology is seen.  Liver: The liver appears unremarkable.  Biliary System: No intrahepatic or extrahepatic biliary duct dilatation is seen.  Gallbladder: The gallbladder is distended but otherwise appears unremarkable with no stones wall thickening or pericholecystic fluid or inflammatory change.  Pancreas: The pancreas appears unremarkable.  Spleen: The spleen appears unremarkable.  Adrenals: The adrenal glands appear unremarkable.  Kidneys: The kidneys appear unremarkable with no stones cysts masses or hydronephrosis.  Aorta: The abdominal aorta appears unremarkable.  IVC: Unremarkable.  Bowel:  Esophagus: The visualized esophagus appears unremarkable.  Stomach: The stomach appears unremarkable.  Duodenum: Unremarkable appearing duodenum.  Small Bowel: The small bowel appears unremarkable.  Colon: Nondistended.  Appendix: The appendix appears unremarkable.  Peritoneum: No intraperitoneal free air or ascites is seen.    Pelvis:  Bladder: The bladder appears unremarkable.  Male:  Prostate gland: The prostate gland appears unremarkable.    Bony structures:  Dorsal Spine: The visualized dorsal spine appears unremarkable.      Impression:  1. The gallbladder is distended but otherwise appears unremarkable with no stones wall thickening or pericholecystic fluid or inflammatory change. Correlate with clinical and laboratory findings as regards additional evaluation and follow-up.  2. No acute intraabdominal or pelvic solid organ  or bowel pathology identified. Details and other findings as discussed above.                                   Medications   piperacillin-tazobactam (ZOSYN) 4.5 gram injection (  Not Given 7/31/22 1115)   sodium chloride 0.9% bolus 1,000 mL (0 mLs Intravenous Stopped 7/31/22 0330)   morphine injection 4 mg (4 mg Intravenous Given 7/31/22 0230)   ondansetron injection 4 mg (4 mg Intravenous Given 7/31/22 0230)   HYDROmorphone (PF) injection 1 mg (1 mg Intravenous Given 7/31/22 0330)   iopamidoL (ISOVUE-370) injection 100 mL (100 mLs Intravenous Given 7/31/22 0425)   morphine injection 4 mg (4 mg Intravenous Given 7/31/22 0845)   piperacillin-tazobactam (ZOSYN) 4.5 g in dextrose 5 % in water (D5W) 5 % 100 mL IVPB (MB+) (4.5 g Intravenous New Bag 7/31/22 1206)     Medical Decision Making:   Clinical Tests:   Lab Tests: Ordered and Reviewed          Scribe Attestation:   Scribe #1: I performed the above scribed service and the documentation accurately describes the services I performed. I attest to the accuracy of the note.    Attending Attestation:           Physician Attestation for Scribe:  Physician Attestation Statement for Scribe #1: I, Sam Corrales MD, reviewed documentation, as scribed by Satnam Jones in my presence, and it is both accurate and complete.             ED Course as of 08/11/22 0521   Sun Jul 31, 2022   0610 Paged surgical hospitalist [BG]   0610 Call and consult: surgical hospitalist [BG]   0606 Patient's CT scan showed a slightly dilated gallbladder his ultrasound showed some gallstones but no overt evidence of cholecystitis.  He has no wall thickening or pericholecystic fluid.  CBD is normal.  Despite having morphine and Dilaudid in the emergency department the patient does appear comfortable but he still tenderness right upper quadrant so I spoke with surgery who says he will come by and take a look at him determine his ultimate disposition.  I do think he will need to see a surgeon  at some point given his gallstones and pain however it is questionable whether or not he actually has an early cholecystitis. [NL]      ED Course User Index  [BG] Satnam Jones  [NL] Sam Corrales MD             Clinical Impression:   Final diagnoses:  [K80.71] Calculus of gallbladder and bile duct with obstruction without cholecystitis          ED Disposition Condition    Observation               Sam Corrales MD  08/11/22 0521

## 2022-07-31 NOTE — TRANSFER OF CARE
"Anesthesia Transfer of Care Note    Patient: Samir Zazueta    Procedure(s) Performed: Procedure(s) (LRB):  CHOLECYSTECTOMY, LAPAROSCOPIC (N/A)    Patient location: PACU    Anesthesia Type: general    Transport from OR: Transported from OR on 2-3 L/min O2 by NC with adequate spontaneous ventilation    Post pain: adequate analgesia    Post assessment: no apparent anesthetic complications    Post vital signs: stable    Level of consciousness: responds to stimulation    Nausea/Vomiting: no nausea/vomiting    Complications: none    Transfer of care protocol was followedComments: Detailed report with handoff to licensed provider complete      Last vitals:   Visit Vitals  BP (!) 149/94   Pulse 66   Temp 36.5 °C (97.7 °F) (Temporal)   Resp 18   Ht 6' 0.05" (1.83 m)   Wt 99.8 kg (220 lb)   SpO2 100%   BMI 29.80 kg/m²     "

## 2022-07-31 NOTE — ANESTHESIA PROCEDURE NOTES
Intubation    Date/Time: 7/31/2022 11:55 AM  Performed by: Vivek Saenz CRNA  Authorized by: Bartolo Harris MD     Intubation:     Induction:  Rapid sequence induction    Intubated:  Postinduction    Mask Ventilation:  N/a    Attempts:  1    Attempted By:  CRNA    Method of Intubation:  Direct    Blade:  Edi 3    Laryngeal View Grade: Grade I - full view of cords      Difficult Airway Encountered?: No      Complications:  None    Airway Device:  Oral endotracheal tube    Airway Device Size:  7.5    Style/Cuff Inflation:  Cuffed (inflated to minimal occlusive pressure)    Tube secured:  23    Secured at:  The teeth    Placement Verified By:  Capnometry    Complicating Factors:  None    Findings Post-Intubation:  BS equal bilateral and atraumatic/condition of teeth unchanged

## 2022-07-31 NOTE — ANESTHESIA PREPROCEDURE EVALUATION
07/31/2022  Samir Zazueta is a 31 y.o., male.    Samir Zazueta    Pre-op Diagnosis: Abdominal pain [R10.9]    Procedure(s):  CHOLECYSTECTOMY, LAPAROSCOPIC     Review of patient's allergies indicates:  No Known Allergies    Meds - negative  PMH denies  PSH - Abscess I & D > NAAC  ROS - hurting all night    NPO since MN      VITAL SIGNS: 24 HR MIN & MAX LAST  Temp  Min: 36.5 °C (97.7 °F)  Max: 36.5 °C (97.7 °F) 36.5 °C (97.7 °F)  BP  Min: 136/83  Max: 164/108 (!) 149/94  Pulse  Min: 60  Max: 118 66  Resp  Min: 8  Max: 34 18  SpO2  Min: 88 %  Max: 100 % 100 %    Lab Results   Component Value Date    WBC 16.4 (H) 07/31/2022    HGB 15.8 07/31/2022    HCT 48.3 07/31/2022    MCV 89.6 07/31/2022     07/31/2022   BMP  Lab Results   Component Value Date     07/31/2022    K 3.6 07/31/2022    CO2 23 07/31/2022    BUN 8.7 (L) 07/31/2022    CREATININE 0.87 07/31/2022    CALCIUM 9.3 07/31/2022    EGFRNONAA >60 07/31/2022      CT Abdomen Pelvis With Contrast    Result Date: 7/31/2022  EXAMINATION: CT ABDOMEN PELVIS WITH CONTRAST CLINICAL HISTORY: Abdominal pain, acute, nonlocalized; TECHNIQUE: CT imaging of the abdomen and pelvis after the administration of intravenous contrast. Dose length product is 474 mGycm. Automatic exposure control, adjustment of mA/kV or iterative reconstruction technique used to limit radiation dose. COMPARISON: No relevant comparison studies available at the time of dictation. FINDINGS: Liver/biliary: Small site of focal hepatic fat near the falciform ligament.  Gallbladder mildly distended.  No radiodense gallstone or biliary dilatation. Pancreas: Normal. Spleen: Normal. Adrenals: Normal. Genitourinary: Symmetric renal enhancement. No hydronephrosis. Bladder within normal limits. Stomach/bowel: No evidence of bowel obstruction. Normal appendix. No definitive sites of bowel inflammation.  Lymph nodes: No pathologically enlarged lymph node identified. Peritoneum: No ascites or free air. No fluid collection. Vasculature: Normal abdominal aortic caliber. Abdominal wall: Normal. Lung bases: No consolidation or pleural effusion. Musculoskeletal: No acute osseous findings.     No acute process identified. No significant discrepancy between my interpretation and the preliminary radiology report. Electronically signed by: Randolph Yang Date:    07/31/2022 Time:    07:10    US Abdomen Limited_Gallbladder    Result Date: 7/31/2022  START OF REPORT: Technique: Limited abdominal ultrasound of the liver and gallbladder. Comparison: None. Clinical history: Abd pain Assess GB. Findings: Liver: Unremarkable appearing liver which measures 18 cm in greatest dimension. No intrahepatic or extrahepatic duct dilatation is seen. Biliary ducts: Billiary ducts: The common bile duct measures 3 mm in diameter. Gallbladder: The gallbladder is full but not distendedand contains multiple tiny stones butotherwise appears unremarkable with no gallbladder wall thickening (2.2 mm) and no pericholecystic fluid and a negative sonographic Pablo's sign. The technologist does note possible subtle hyper vascularity around the gallbladder. Portal vein: Flow parameters are within normal limits with hepatopetal flow. Impression: 1. The gallbladder is full but not distendedand contains multiple tiny stones butotherwise appears unremarkable with no gallbladder wall thickening (2.2 mm) and no pericholecystic fluid and a negative sonographic Pablo's sign. The technologist does note possible subtle hyper vascularity around the gallbladder. Correlate with clinical and laboratory findings as cholecystitis is not entirely excluded. 2. Details and other findings as above.         Pre-op Assessment    I have reviewed the Patient Summary Reports.    I have reviewed the NPO Status.   I have reviewed the Medications.     Review of Systems  Anesthesia  Hx:  No problems with previous Anesthesia  Denies Family Hx of Anesthesia complications.   Denies Personal Hx of Anesthesia complications.   Social:  Smoker    Cardiovascular:   Exercise tolerance: good  Denies Angina.  Denies Orthopnea.  Denies PND.  Denies VASQUEZ.  Functional Capacity good / => 4 METS    Musculoskeletal:  Musculoskeletal Normal    Neurological:   Denies TIA. Denies CVA.    Psych:  Psychiatric Normal           Physical Exam  General: Well nourished, Alert and Oriented    Airway:  Mallampati: III   Mouth Opening: Normal  TM Distance: Normal  Tongue: Normal  Neck ROM: Normal ROM    Dental:  Intact    Chest/Lungs:  Clear to auscultation    Heart:  Rate: Normal  Rhythm: Regular Rhythm  No pretibial edema  No carotid bruits      Anesthesia Plan  Type of Anesthesia, risks & benefits discussed:    Anesthesia Type: Gen ETT  Intra-op Monitoring Plan: Standard ASA Monitors  Post Op Pain Control Plan: multimodal analgesia  Induction:  IV and rapid sequence  Airway Plan: Direct, Post-Induction  Informed Consent: Informed consent signed with the Patient and all parties understand the risks and agree with anesthesia plan.  All questions answered. Patient consented to blood products? Yes  ASA Score: 2 Emergent  Day of Surgery Review of History & Physical: H&P Update referred to the surgeon/provider.    Ready For Surgery From Anesthesia Perspective.     .

## 2022-07-31 NOTE — BRIEF OP NOTE
[facility] - Periop Services  Surgery Department  Brief Operative Note    SUMMARY     Date of Procedure: 7/31/2022     Procedure: Procedure(s) (LRB):  CHOLECYSTECTOMY, LAPAROSCOPIC (N/A)     Surgeon(s) and Role:     * Jai Kitchen Jr., MD - Primary  Humera Harrison MD - Intern      Pre-Operative Diagnosis: Abdominal pain [R10.9]    Post-Operative Diagnosis: Post-Op Diagnosis Codes:     * Abdominal pain [R10.9]    Procedures: laparoscopic cholecystectomy    Anesthesia: General    Operative Findings (including complications, if any): cholecystitis with cholelithiasis and necrosis of gallbladder    Estimated Blood Loss (EBL): 5mL           Implants: * No implants in log *    Specimens:   Specimen (24h ago, onward)             Start     Ordered    07/31/22 1218  Specimen to Pathology  RELEASE UPON ORDERING        References:    Click here for ordering Quick Tip   Question:  Release to patient  Answer:  Immediate    07/31/22 1218                        Condition: Good    Disposition: PACU - hemodynamically stable.    Attestation: I was present and scrubbed for the entire procedure.

## 2022-07-31 NOTE — H&P
General/Acute Care Surgery   History and Physical     HD#0  POD#* No surgery found *    HPI  Mr. Zazueta is a 31yoM with no PHMx who presented to ED with abdominal pain onset 2130 yesterday. Pain located mostly in RUQ. + n/v.    Past Medical History: none  Surgical History: I&D rectal abscess a few months ago    Review of Systems: 10 point ROS negative except as stated in HPI    Scheduled Meds:    Continuous Infusions:    PRN Meds:     Objective  Temp:  [97.7 °F (36.5 °C)] 97.7 °F (36.5 °C)  Pulse:  [] 81  Resp:  [8-34] 18  SpO2:  [88 %-100 %] 97 %  BP: (136-164)/() 143/95     No intake/output data recorded.  No intake/output data recorded.     GEN: moderate distress  NEURO: AAOx3  RESP: No increased WOB, equal rise and fall of the chest  CV: Regular rate   ABD: Soft, tender to palpation, greatest in RUQ. Nondistended.   : Chen none  EXT: Moving all extremities spontaneously     Labs  Recent Labs     07/31/22  0239   WBC 16.4*   HGB 15.8   HCT 48.3        Recent Labs     07/31/22  0239      K 3.6   CO2 23   BUN 8.7*   CREATININE 0.87   CALCIUM 9.3   ALBUMIN 4.1   BILITOT 0.8   AST 24   ALKPHOS 75   ALT 34       Imaging  CTAP - gallbladder mildly distended, no gallstone or biliary dilation  U/S - gallbladder full, not distended, multiple small stones. No wall thickening, no pericholecystic fluid.     Assessment/Plan  Mr. Zazueta is a 31yoM with no PMHx, PSHx rectal abscess drainage who presents to ED with acute onset abdominal pain greatest in RUQ. CT negative but RUQ u/s with stones.    - Plan for lap vs open simran  - NPO      Humera Harrison MD  [employer] General Surgery    7/31/2022  8:40 AM

## 2022-08-01 VITALS
HEART RATE: 80 BPM | WEIGHT: 220 LBS | HEIGHT: 72 IN | BODY MASS INDEX: 29.8 KG/M2 | RESPIRATION RATE: 18 BRPM | SYSTOLIC BLOOD PRESSURE: 134 MMHG | OXYGEN SATURATION: 97 % | DIASTOLIC BLOOD PRESSURE: 78 MMHG | TEMPERATURE: 98 F

## 2022-08-01 LAB
ALBUMIN SERPL-MCNC: 3.6 GM/DL (ref 3.5–5)
ALBUMIN/GLOB SERPL: 1.4 RATIO (ref 1.1–2)
ALP SERPL-CCNC: 78 UNIT/L (ref 40–150)
ALT SERPL-CCNC: 63 UNIT/L (ref 0–55)
AST SERPL-CCNC: 27 UNIT/L (ref 5–34)
BASOPHILS # BLD AUTO: 0.03 X10(3)/MCL (ref 0–0.2)
BASOPHILS NFR BLD AUTO: 0.2 %
BILIRUBIN DIRECT+TOT PNL SERPL-MCNC: 1.2 MG/DL
BUN SERPL-MCNC: 7.2 MG/DL (ref 8.9–20.6)
CALCIUM SERPL-MCNC: 9 MG/DL (ref 8.4–10.2)
CHLORIDE SERPL-SCNC: 102 MMOL/L (ref 98–107)
CO2 SERPL-SCNC: 26 MMOL/L (ref 22–29)
CREAT SERPL-MCNC: 0.71 MG/DL (ref 0.73–1.18)
EOSINOPHIL # BLD AUTO: 0 X10(3)/MCL (ref 0–0.9)
EOSINOPHIL NFR BLD AUTO: 0 %
ERYTHROCYTE [DISTWIDTH] IN BLOOD BY AUTOMATED COUNT: 12.3 % (ref 11.5–17)
GLOBULIN SER-MCNC: 2.6 GM/DL (ref 2.4–3.5)
GLUCOSE SERPL-MCNC: 99 MG/DL (ref 74–100)
HCT VFR BLD AUTO: 47.8 % (ref 42–52)
HGB BLD-MCNC: 15.7 GM/DL (ref 14–18)
IMM GRANULOCYTES # BLD AUTO: 0.07 X10(3)/MCL (ref 0–0.04)
IMM GRANULOCYTES NFR BLD AUTO: 0.5 %
LYMPHOCYTES # BLD AUTO: 2.1 X10(3)/MCL (ref 0.6–4.6)
LYMPHOCYTES NFR BLD AUTO: 16.1 %
MAGNESIUM SERPL-MCNC: 1.9 MG/DL (ref 1.6–2.6)
MCH RBC QN AUTO: 29.1 PG (ref 27–31)
MCHC RBC AUTO-ENTMCNC: 32.8 MG/DL (ref 33–36)
MCV RBC AUTO: 88.5 FL (ref 80–94)
MONOCYTES # BLD AUTO: 1.37 X10(3)/MCL (ref 0.1–1.3)
MONOCYTES NFR BLD AUTO: 10.5 %
NEUTROPHILS # BLD AUTO: 9.5 X10(3)/MCL (ref 2.1–9.2)
NEUTROPHILS NFR BLD AUTO: 72.7 %
NRBC BLD AUTO-RTO: 0 %
PHOSPHATE SERPL-MCNC: 3.3 MG/DL (ref 2.3–4.7)
PLATELET # BLD AUTO: 291 X10(3)/MCL (ref 130–400)
PMV BLD AUTO: 10.5 FL (ref 7.4–10.4)
POTASSIUM SERPL-SCNC: 4.3 MMOL/L (ref 3.5–5.1)
PROT SERPL-MCNC: 6.2 GM/DL (ref 6.4–8.3)
RBC # BLD AUTO: 5.4 X10(6)/MCL (ref 4.7–6.1)
SODIUM SERPL-SCNC: 138 MMOL/L (ref 136–145)
WBC # SPEC AUTO: 13 X10(3)/MCL (ref 4.5–11.5)

## 2022-08-01 PROCEDURE — 85025 COMPLETE CBC W/AUTO DIFF WBC: CPT

## 2022-08-01 PROCEDURE — 80053 COMPREHEN METABOLIC PANEL: CPT

## 2022-08-01 PROCEDURE — 63600175 PHARM REV CODE 636 W HCPCS

## 2022-08-01 PROCEDURE — 84100 ASSAY OF PHOSPHORUS: CPT

## 2022-08-01 PROCEDURE — 83735 ASSAY OF MAGNESIUM: CPT

## 2022-08-01 PROCEDURE — 25000003 PHARM REV CODE 250

## 2022-08-01 PROCEDURE — 36415 COLL VENOUS BLD VENIPUNCTURE: CPT

## 2022-08-01 RX ORDER — OXYCODONE HYDROCHLORIDE 5 MG/1
5 TABLET ORAL EVERY 4 HOURS PRN
Qty: 15 TABLET | Refills: 0 | Status: SHIPPED | OUTPATIENT
Start: 2022-08-01 | End: 2022-08-04 | Stop reason: SDUPTHER

## 2022-08-01 RX ADMIN — ACETAMINOPHEN 325MG 650 MG: 325 TABLET ORAL at 05:08

## 2022-08-01 RX ADMIN — KETOROLAC TROMETHAMINE 15 MG: 30 INJECTION, SOLUTION INTRAMUSCULAR; INTRAVENOUS at 05:08

## 2022-08-01 RX ADMIN — SODIUM CHLORIDE: 9 INJECTION, SOLUTION INTRAVENOUS at 05:08

## 2022-08-01 NOTE — HOSPITAL COURSE
Admitted with acute cholecystitis. Had uncomplicated laparoscopic cholecystectomy with normal post-operative course. We will discharge with normal post-op instructions and call for follow up with pathology.

## 2022-08-01 NOTE — PROGRESS NOTES
D/c information reviewed with pt. Pt verbalized understanding. RX brought to pt by retail pharmacy.

## 2022-08-01 NOTE — PROGRESS NOTES
Trauma/Acute Care Surgery   Daily Progress Note     HD#1  POD#1 Day Post-Op    Subjective: POD 1 from lap simran. Pain under control, tolerating diet, denies n/v     Scheduled Meds:   acetaminophen  650 mg Oral Q6H    enoxaparin  40 mg Subcutaneous Daily    ketorolac  15 mg Intravenous Q6H       Continuous Infusions:   sodium chloride 0.9% 125 mL/hr at 07/31/22 0959       PRN Meds:    ondansetron, oxyCODONE, oxyCODONE       Objective  Temp:  [97.4 °F (36.3 °C)-98.6 °F (37 °C)] 97.7 °F (36.5 °C)  Pulse:  [66-97] 84  Resp:  [4-21] 16  SpO2:  [97 %-100 %] 97 %  BP: (103-157)/(59-99) 133/73       Physical Exam:  General: NAD, pt sitting comfortably in bed  HEENT: NCAT  Cardio: RRR  Resp: no increased WOB, satting well on RA  Abd: soft, non-distended, appropriately tender to palpation  Wounds: Lap incisions c/d/i with steri strips        Labs    Recent Labs     07/31/22  0239 08/01/22  0446   WBC 16.4* 13.0*   HGB 15.8 15.7   HCT 48.3 47.8    291     Recent Labs     07/31/22  0239 08/01/22  0446    138   K 3.6 4.3   CO2 23 26   BUN 8.7* 7.2*   CREATININE 0.87 0.71*   CALCIUM 9.3 9.0   MG  --  1.90   PHOS  --  3.3   ALBUMIN 4.1 3.6   BILITOT 0.8 1.2   AST 24 27   ALKPHOS 75 78   ALT 34 63*       Path: Gallbladder pending     Assessment/Plan  31 year old man post op day one from lap simran. WBC down trending from 16.4 to 13.0    Pain well under control  Tolerating regular diet well  Possible d/c later today       Diet: Regular  DVT Ppx: Lovenox       Dispo: Home pending improvement     Teri Lindsay MD   [employer] General Surgery PGY1      8/1/2022  5:37 AM

## 2022-08-01 NOTE — DISCHARGE SUMMARY
[facility] - 9th Floor Med Surg  General Surgery  Discharge Summary      Patient Name: Samir Zazueta  MRN: [id number]  Admission Date: 7/31/2022  Hospital Length of Stay: 1 days  Discharge Date and Time:  08/01/2022 10:11 AM  Attending Physician: Jai Kitchen Jr., MD   Discharging Provider: MARKIE Menendez  Primary Care Provider: Debby Elizondo MD    HPI:   No notes on file    Procedure(s) (LRB):  CHOLECYSTECTOMY, LAPAROSCOPIC (N/A)      Indwelling Lines/Drains at time of discharge:   Lines/Drains/Airways     None               Hospital Course: Admitted with acute cholecystitis. Had uncomplicated laparoscopic cholecystectomy with normal post-operative course. We will discharge with normal post-op instructions and call for follow up with pathology.      Goals of Care Treatment Preferences:  Code Status: Full Code      Consults:     Significant Diagnostic Studies: Labs: All labs within the past 24 hours have been reviewed    Pending Diagnostic Studies:     Procedure Component Value Units Date/Time    Specimen to Pathology [[id number]] Collected: 07/31/22 1218    Order Status: Sent Lab Status: No result     Specimen: Tissue from Gallbladder         Final Active Diagnoses:    Diagnosis Date Noted POA    PRINCIPAL PROBLEM:  Cholelithiasis with acute cholecystitis [K80.00] 07/31/2022 Yes      Problems Resolved During this Admission:      Discharged Condition: good    Disposition: Home or Self Care    Follow Up:   Follow-up Information     [facility] Follow up.    Why: Office will call with a telemed visit on 8/10/22. Please call with any questions or concerns  Contact information:  [address] [place] [address] [phone]                       Patient Instructions:      Weight bearing restrictions (specify):   Order Comments: No heavy lifting for 3 weeks.     Medications:  Reconciled Home Medications:      Medication List      START taking these medications     oxyCODONE 5 MG immediate release tablet  Commonly known as: ROXICODONE  Take 1 tablet (5 mg total) by mouth every 4 (four) hours as needed for Pain.          Time spent on the discharge of patient: 15  minutes    MARKIE Menendez  General Surgery  [facility] - 9th Floor Med Surg

## 2022-08-02 LAB
ESTROGEN SERPL-MCNC: NORMAL PG/ML
INSULIN SERPL-ACNC: NORMAL U[IU]/ML
LAB AP CLINICAL INFORMATION: NORMAL
LAB AP GROSS DESCRIPTION: NORMAL
LAB AP REPORT FOOTNOTES: NORMAL
T3RU NFR SERPL: NORMAL %

## 2022-08-03 ENCOUNTER — PATIENT OUTREACH (OUTPATIENT)
Dept: ADMINISTRATIVE | Facility: CLINIC | Age: 32
End: 2022-08-03

## 2022-08-03 NOTE — PROGRESS NOTES
C3 nurse attempted to contact Samir Zazueta for a TCC post hospital discharge follow up call. No answer. No voicemail available. The patient has a scheduled HOSFU appointment with [facility] on 8/10 via telemed.

## 2022-08-03 NOTE — PROGRESS NOTES
C3 nurse spoke with Samir Zazueta for a TCC post hospital discharge follow up call. The patient has a scheduled HOSFU appointment with [facility] on via telemed.

## 2022-08-04 ENCOUNTER — NURSE TRIAGE (OUTPATIENT)
Dept: ADMINISTRATIVE | Facility: CLINIC | Age: 32
End: 2022-08-04

## 2022-08-04 ENCOUNTER — HOSPITAL ENCOUNTER (EMERGENCY)
Facility: HOSPITAL | Age: 32
Discharge: HOME | End: 2022-08-04
Attending: STUDENT IN AN ORGANIZED HEALTH CARE EDUCATION/TRAINING PROGRAM

## 2022-08-04 VITALS
DIASTOLIC BLOOD PRESSURE: 87 MMHG | RESPIRATION RATE: 16 BRPM | TEMPERATURE: 98 F | OXYGEN SATURATION: 100 % | HEART RATE: 99 BPM | BODY MASS INDEX: 30.8 KG/M2 | HEIGHT: 71 IN | WEIGHT: 220 LBS | SYSTOLIC BLOOD PRESSURE: 139 MMHG

## 2022-08-04 DIAGNOSIS — R58 ECCHYMOSIS: ICD-10-CM

## 2022-08-04 DIAGNOSIS — Z48.89 ENCOUNTER FOR POST SURGICAL WOUND CHECK: Primary | ICD-10-CM

## 2022-08-04 PROCEDURE — 99283 EMERGENCY DEPT VISIT LOW MDM: CPT

## 2022-08-04 RX ORDER — OXYCODONE HYDROCHLORIDE 5 MG/1
5 TABLET ORAL EVERY 8 HOURS PRN
Qty: 9 TABLET | Refills: 0 | Status: SHIPPED | OUTPATIENT
Start: 2022-08-04 | End: 2022-08-07

## 2022-08-04 NOTE — PROGRESS NOTES
S: s/p lap simran. Complains of bruising around umbilicus. No fever. Eating well.     O:  Gen: AAO x3. NAD. Well appearing.  Resp: Speaking in full and complete sentences.   Cards: Normal peripheral pulses.  GI: Soft. NT. ND. Small soft bruise at umbilicus.     A/P:  S/p lap appe    Okay to DC. No post-op concerns.     Final Diagnosis   GALLBLADDER, CHOLECYSTECTOMY:     ACUTE AND CHRONIC CHOLECYSTITIS, MODERATE, WITH CHOLELITHIASIS.     CODE 6

## 2022-08-04 NOTE — TELEPHONE ENCOUNTER
Pt states he had his gallbladder removed 3 days ago, states new bruising, and severe pain to mid abd area.  States was not like this 12 hours ago.  Care advice states go to the ED now.  All questions answered.    Reason for Disposition   Severe pain in the incision    Additional Information   Negative: [1] Major abdominal surgical incision AND [2] wound gaping open AND [3] visible internal organs   Negative: Sounds like a life-threatening emergency to the triager   Negative: [1] Bleeding from incision AND [2] won't stop after 10 minutes of direct pressure   Negative: [1] Widespread rash AND [2] bright red, sunburn-like    Protocols used: POST-OP INCISION SYMPTOMS AND [id number]

## 2022-08-04 NOTE — DISCHARGE INSTRUCTIONS
Follow up with general surgery.     Return to the emergency department if any worsening pain, nausea, vomiting, fever, swelling, or any other symptoms.

## 2022-08-04 NOTE — ED PROVIDER NOTES
"Encounter Date: 8/4/2022    SCRIBE #1 NOTE: I, Satnam Jones, am scribing for, and in the presence of,  Keven Benitez MD. I have scribed the following portions of the note - Other sections scribed: HPI, ROS, PE.       History     Chief Complaint   Patient presents with    Post-op Problem     Gallbladder removed x3 days ago. Pt noticed edema, bruising above incision in umbilicus. Referred to ED by on call help service     32 y/o male presents to ED c/o bruising above belly button post cholecystectomy. Pt states he "was worried about the redness above his belly button". Pt states that there is a small "knot" in the same area that feels like painful like cramping muscles. Pt denies nausea and vomiting. Pt states he is taking oxycodone and tylenol for post op pain. Pt denies any new pain post op.    The history is provided by the patient. No  was used.   Abdominal Pain  The other symptoms of the illness do not include fever, shortness of breath or dysuria.   The patient states that she believes she is currently not pregnant. The patient has not had a change in bowel habit.     Review of patient's allergies indicates:  No Known Allergies  History reviewed. No pertinent past medical history.  Past Surgical History:   Procedure Laterality Date    LAPAROSCOPIC CHOLECYSTECTOMY N/A 7/31/2022    Procedure: CHOLECYSTECTOMY, LAPAROSCOPIC;  Surgeon: Jai Kitchen Jr., MD;  Location: [facility];  Service: General;  Laterality: N/A;     History reviewed. No pertinent family history.     Review of Systems   Constitutional: Negative for fever.   HENT: Negative for sore throat.    Eyes: Negative for visual disturbance.   Respiratory: Negative for shortness of breath.    Cardiovascular: Negative for chest pain.   Gastrointestinal: Negative for abdominal pain.   Genitourinary: Negative for dysuria.   Musculoskeletal: Negative for joint swelling.   Skin: Negative for rash.        Bruising above belly button "   Neurological: Negative for weakness.   Psychiatric/Behavioral: Negative for confusion.   All other systems reviewed and are negative.      Physical Exam     Initial Vitals [08/04/22 0522]   BP Pulse Resp Temp SpO2   (!) 146/94 68 14 97.9 °F (36.6 °C) 98 %      MAP       --         Physical Exam    Nursing note and vitals reviewed.  Constitutional: He appears well-developed and well-nourished. He is not diaphoretic. No distress.   HENT:   Head: Normocephalic and atraumatic.   Eyes: Conjunctivae and EOM are normal. Pupils are equal, round, and reactive to light.   Neck:   Normal range of motion.  Cardiovascular: Normal rate, regular rhythm, normal heart sounds and intact distal pulses.   No murmur heard.  Pulmonary/Chest: Breath sounds normal. No respiratory distress. He has no wheezes. He has no rales.   Abdominal: Abdomen is soft. He exhibits no distension. There is no abdominal tenderness.   Surgical incision sites clean and dry, surgical incision site above level of umbilicus has 3x2 cm area of ecchymosis.  No drainage or wound dehiscence.    Musculoskeletal:         General: No tenderness or edema. Normal range of motion.      Cervical back: Normal range of motion.     Neurological: He is alert and oriented to person, place, and time. No cranial nerve deficit.   Skin: Skin is warm and dry. Capillary refill takes less than 2 seconds. No rash noted. No erythema.   Psychiatric: He has a normal mood and affect.         ED Course   Procedures  Labs Reviewed - No data to display       Imaging Results    None          Medications - No data to display  Medical Decision Making:   ED Management:  Patient is a 30 y/o male presents for post op bruising noted to umbilicus incision site.  No new abdominal pain.  Tolerating PO.  States called surgery on call and instructed to come to ED for eval.  Abdomen soft, nontender, no rebound, no guarding.  No new pain or nausea/vomiting.  Area of ecchymosis near incision site  appropriate after post op.  No flank ecchymosis, no other peritoneal signs.  Discussed with surgery; who evaluated patient. Reassessed patient.  Patient is resting comfortably.  Discussed all results.  Discussed need for follow-up.  Discussed return precautions.  Answered all questions at this time.  Hemodynamically stable for continued outpatient management with strict return precautions.  Patient verbalized understanding agreed to plan.            Scribe Attestation:   Scribe #1: I performed the above scribed service and the documentation accurately describes the services I performed. I attest to the accuracy of the note.    Attending Attestation:           Physician Attestation for Scribe:  Physician Attestation Statement for Scribe #1: I, Keven Benitez MD, reviewed documentation, as scribed by Satnam Jones in my presence, and it is both accurate and complete.             ED Course as of 22 1538   Thu Aug 04, 2022   0633 Call and consult: general surgery [BG]      ED Course User Index  [BG] Satnam Jones             Clinical Impression:   Final diagnoses:  [Z48.89] Encounter for post surgical wound check (Primary)  [R58] Ecchymosis          ED Disposition Condition    Discharge Stable        ED Prescriptions     Medication Sig Dispense Start Date End Date Auth. Provider    oxyCODONE (ROXICODONE) 5 MG immediate release tablet () Take 1 tablet (5 mg total) by mouth every 8 (eight) hours as needed for Pain. 9 tablet 2022 Keven Benitez MD        Follow-up Information     Follow up With Specialties Details Why Contact Info    Debby Elizondo MD Family Medicine Schedule an appointment as soon as possible for a visit in 1 day  [address] [place] [address]  [phone]      Jai Kitchen Jr., MD General Surgery   [address]  [address]  [facility] [address] [phone]      Silvio De Souza MD General Surgery   [address]  [address]  [facility]  [address]  [phone]             Keven Benitez MD  08/11/22 6906

## 2022-08-10 ENCOUNTER — HOSPITAL ENCOUNTER (EMERGENCY)
Facility: HOSPITAL | Age: 32
Discharge: HOME | End: 2022-08-10
Attending: EMERGENCY MEDICINE

## 2022-08-10 ENCOUNTER — DOCUMENTATION ONLY (OUTPATIENT)
Dept: SURGERY | Facility: HOSPITAL | Age: 32
End: 2022-08-10

## 2022-08-10 VITALS
HEIGHT: 72 IN | SYSTOLIC BLOOD PRESSURE: 135 MMHG | HEART RATE: 100 BPM | DIASTOLIC BLOOD PRESSURE: 84 MMHG | OXYGEN SATURATION: 98 % | WEIGHT: 220.44 LBS | BODY MASS INDEX: 29.86 KG/M2 | RESPIRATION RATE: 16 BRPM | TEMPERATURE: 98 F

## 2022-08-10 DIAGNOSIS — L03.114 CELLULITIS OF LEFT UPPER EXTREMITY: Primary | ICD-10-CM

## 2022-08-10 PROCEDURE — 99284 EMERGENCY DEPT VISIT MOD MDM: CPT | Mod: 25

## 2022-08-10 PROCEDURE — 96372 THER/PROPH/DIAG INJ SC/IM: CPT | Performed by: EMERGENCY MEDICINE

## 2022-08-10 PROCEDURE — 63600175 PHARM REV CODE 636 W HCPCS: Performed by: EMERGENCY MEDICINE

## 2022-08-10 RX ORDER — CEFTRIAXONE 1 G/1
1 INJECTION, POWDER, FOR SOLUTION INTRAMUSCULAR; INTRAVENOUS
Status: COMPLETED | OUTPATIENT
Start: 2022-08-10 | End: 2022-08-10

## 2022-08-10 RX ORDER — CLINDAMYCIN HYDROCHLORIDE 300 MG/1
300 CAPSULE ORAL EVERY 8 HOURS
Qty: 21 CAPSULE | Refills: 0 | OUTPATIENT
Start: 2022-08-10 | End: 2022-09-17

## 2022-08-10 RX ORDER — LIDOCAINE HYDROCHLORIDE 20 MG/ML
5 INJECTION, SOLUTION EPIDURAL; INFILTRATION; INTRACAUDAL; PERINEURAL
Status: DISCONTINUED | OUTPATIENT
Start: 2022-08-10 | End: 2022-08-10 | Stop reason: HOSPADM

## 2022-08-10 RX ADMIN — CEFTRIAXONE SODIUM 1 G: 1 INJECTION, POWDER, FOR SOLUTION INTRAMUSCULAR; INTRAVENOUS at 01:08

## 2022-08-10 NOTE — ED PROVIDER NOTES
Encounter Date: 8/10/2022       History     Chief Complaint   Patient presents with    Recurrent Skin Infections     C/o skin abscess to lt forearm which is red and swollen. Pt states he attempted to squeeze it out.  Onset yesterday     Patient is a 31-year-old male presenting with complain of cellulitic changes to the left forearm.  Patient states he started having erythema and tenderness to the proximal aspect of his left forearm yesterday.  Patient denies fever or chills.  Patient denies IV drug use.  Patient denies any nausea vomiting.        Review of patient's allergies indicates:  No Known Allergies  No past medical history on file.  Past Surgical History:   Procedure Laterality Date    LAPAROSCOPIC CHOLECYSTECTOMY N/A 7/31/2022    Procedure: CHOLECYSTECTOMY, LAPAROSCOPIC;  Surgeon: Jai Kitchen Jr., MD;  Location: [facility];  Service: General;  Laterality: N/A;     No family history on file.     Review of Systems   Constitutional: Negative.    Respiratory: Negative.    Cardiovascular: Negative.    Gastrointestinal: Negative.    Musculoskeletal: Positive for myalgias. Negative for arthralgias and joint swelling.   Skin: Positive for wound.   Neurological: Negative.    Psychiatric/Behavioral: Negative.        Physical Exam     Initial Vitals [08/10/22 0038]   BP Pulse Resp Temp SpO2   135/84 100 16 98.1 °F (36.7 °C) 98 %      MAP       --         Physical Exam    Nursing note and vitals reviewed.  Constitutional: He appears well-developed and well-nourished.   Neck: Neck supple.   Normal range of motion.  Cardiovascular: Normal rate, regular rhythm, normal heart sounds and intact distal pulses.   Pulmonary/Chest: Breath sounds normal.   Abdominal: Abdomen is soft. Bowel sounds are normal.   Musculoskeletal:         General: Normal range of motion.      Cervical back: Normal range of motion and neck supple.      Comments: There is no pain on passive range of motion of the left upper extremity at the elbow.      Neurological: He is alert and oriented to person, place, and time.   Skin:   Patient has cellulitis to the proximal aspect of the left forearm just distal to the joint.  It is tender palpation.  There is no fluctuance.  There is what appears to be a dried up pustule in the center of the cellulitic area.   Psychiatric: He has a normal mood and affect. His behavior is normal. Judgment and thought content normal.         ED Course   Procedures  Labs Reviewed - No data to display       Imaging Results    None          Medications   cefTRIAXone injection 1 g (has no administration in time range)   LIDOcaine (PF) 20 mg/mL (2%) injection 100 mg (has no administration in time range)                          Clinical Impression:   Final diagnoses:  [L03.114] Cellulitis of left upper extremity (Primary)          ED Disposition Condition    Discharge Stable        ED Prescriptions     Medication Sig Dispense Start Date End Date Auth. Provider    clindamycin (CLEOCIN) 300 MG capsule Take 1 capsule (300 mg total) by mouth every 8 (eight) hours. 21 capsule 8/10/2022  Anselmo Abdalla MD        Follow-up Information     Follow up With Specialties Details Why Contact Info    Debby Elizondo MD Family Medicine In 2 days  [address] [facility] [address]  [phone]      [facility] - Emergency Dept Emergency Medicine  As needed, If symptoms worsen [address] [facility] [id number] [phone]           Anselmo Abdalla MD  08/10/22 0147

## 2022-08-10 NOTE — PROGRESS NOTES
[facility] NOTE    Called the listed home/mobile number 2x. No answer.    Arianna Benitez PA-C

## 2022-09-14 NOTE — OP NOTE
PATIENT:  Samir Zazueta      : 1990       DATE OF SURGERY:   2022          SURGEON:  Jai Kitchen MD       ASSISTANT:  Humera Harrison MD (Resident)          PREOPERATIVE DIAGNOSIS:  Acute / Chronic Cholecystitis           POST OPERATIVE DIAGNOSIS:  Same.           PROCEDURE:  Laparoscopic cholecystectomy.           ANESTHESIA:  General endotracheal anesthesia.           ESTIMATED BLOOD LOSS:  Approximately 20 cc.   Blood administered, none.           LAP AND INSTRUMENT COUNT:  Correct X2 at the end of the case.           SPECIMENS:  Gallbladder.           INDICATION AND SIGNIFICANT HISTORY:  Patient is a 31 y.o.-year-old male complaining of post prandial right upper quadrant pain associated with fatty meals.  Patient was worked up clincally and found to have acute cholecystitis.  Risks and benefits of surgery was discussed with the patient who voiced understanding of risks / benefits and elected to proceed with surgery.                   PROCEDURE IN DETAIL:  Once informed consents were obtained, patient was taken to the operating room and placed supine on the operating table.  After general endotracheal anesthesia was induced, the abdomen was prepped and draped in the standard sterile surgical fashion.  Periumbilical incision was made with the scalpel and the Veress needle was used to enter the abdominal cavity.  Pneumoperitoneum was then created with insufflation.  After adequate insufflation was obtained, 11 millimeter trocar port were placed through this wound.  Two additional 5 millimeter ports were placed in the right upper quadrant followed by 5 millimeter trocar placed subxiphoid.  The gallbladder was then visualized appeared to have acute and chronic inflammatory changes and retracted both superiorly and laterally to achieve the critical view.  Dissection was carried out in lateral to medial fashion.  The cystic duct were first visualized followed by cystic artery appropriate.  Two  clips were placed twice proximally on each structure and one distally and resected.  The gallbladder was then removed from the liver bed using the hook cautery and passed off the table as specimen through the periumbilical trocar port site.  Attention was then redirected to the gallbladder fossa, no active bleeding was noted.  Good hemostasis was visualized.  All ports were then removed under direct visualization with no active bleeding noted.  Skin was then closed with 4-0 Vicryl suture in interrupted fashion.  Skin was  cleaned and dry sterile dressing was placed on the wound.  Lap and instrument  counts were correct X2 at the end of the case.  Patient tolerated the procedure well and was transferred to recovery room in good condition.

## 2022-09-17 ENCOUNTER — HOSPITAL ENCOUNTER (EMERGENCY)
Facility: HOSPITAL | Age: 32
Discharge: HOME | End: 2022-09-17
Attending: EMERGENCY MEDICINE

## 2022-09-17 VITALS
RESPIRATION RATE: 18 BRPM | DIASTOLIC BLOOD PRESSURE: 81 MMHG | HEART RATE: 96 BPM | TEMPERATURE: 98 F | OXYGEN SATURATION: 98 % | SYSTOLIC BLOOD PRESSURE: 132 MMHG

## 2022-09-17 DIAGNOSIS — K04.7 DENTAL ABSCESS: Primary | ICD-10-CM

## 2022-09-17 PROCEDURE — 99284 EMERGENCY DEPT VISIT MOD MDM: CPT

## 2022-09-17 RX ORDER — IBUPROFEN 800 MG/1
800 TABLET ORAL 3 TIMES DAILY
Qty: 30 TABLET | Refills: 0 | Status: SHIPPED | OUTPATIENT
Start: 2022-09-17 | End: 2023-04-29 | Stop reason: ALTCHOICE

## 2022-09-17 RX ORDER — CLINDAMYCIN HYDROCHLORIDE 300 MG/1
300 CAPSULE ORAL EVERY 8 HOURS
Qty: 30 CAPSULE | Refills: 0 | Status: SHIPPED | OUTPATIENT
Start: 2022-09-17 | End: 2022-09-27

## 2022-09-17 RX ORDER — CHLORHEXIDINE GLUCONATE ORAL RINSE 1.2 MG/ML
15 SOLUTION DENTAL 2 TIMES DAILY
Qty: 420 ML | Refills: 0 | Status: SHIPPED | OUTPATIENT
Start: 2022-09-17 | End: 2022-10-01

## 2022-09-17 NOTE — FIRST PROVIDER EVALUATION
Medical screening examination initiated.  I have conducted a focused provider triage encounter, findings are as follows:    Brief history of present illness:  +facial swelling, hx broken tooth     Vitals:    09/17/22 0923   BP: 132/81   Pulse: 96   Resp: 18   Temp: 98.4 °F (36.9 °C)   SpO2: 98%       Pertinent physical exam:  +left sided facial swelling     Brief workup plan:  exam     Preliminary workup initiated; this workup will be continued and followed by the physician or advanced practice provider that is assigned to the patient when roomed.

## 2022-09-17 NOTE — ED PROVIDER NOTES
Encounter Date: 9/17/2022       History     Chief Complaint   Patient presents with    Dental Pain     Pt. C/o right sided facial swelling started x2 days ago.. reports having broken tooth for about 6 months.. denies fever..      Patient presents with:  Dental Pain: Pt. C/o right sided facial swelling started x2 days ago.. reports having broken tooth for about 6 months.. denies fever..     30 YO CM IN NAD PRESENTS WITH  C/O 2 DAYS OF RIGHT LOWER FACIAL SWELLING/PAIN. HX BROKEN TEETH AND MULTIPLE DENTAL CARIES WITH LIMITED DENTAL CARE. DENIES FEVER, DRAINAGE, DIFFICULTY OPENING/CLOSING MOUTH.     Review of patient's allergies indicates:  No Known Allergies  No past medical history on file.  Past Surgical History:   Procedure Laterality Date    LAPAROSCOPIC CHOLECYSTECTOMY N/A 7/31/2022    Procedure: CHOLECYSTECTOMY, LAPAROSCOPIC;  Surgeon: Jai Kitchen Jr., MD;  Location: [facility];  Service: General;  Laterality: N/A;     No family history on file.     Review of Systems   Constitutional: Negative.    HENT:  Positive for dental problem and facial swelling.    Eyes: Negative.    Respiratory: Negative.     Cardiovascular: Negative.    Gastrointestinal: Negative.    Endocrine: Negative.    Genitourinary: Negative.    Musculoskeletal: Negative.    Skin: Negative.    Allergic/Immunologic: Negative.    Neurological: Negative.    Hematological: Negative.    Psychiatric/Behavioral: Negative.     All other systems reviewed and are negative.    Physical Exam     Initial Vitals [09/17/22 0923]   BP Pulse Resp Temp SpO2   132/81 96 18 98.4 °F (36.9 °C) 98 %      MAP       --         Physical Exam    Nursing note and vitals reviewed.  Constitutional: He appears well-developed and well-nourished. He is not diaphoretic. No distress.   HENT:   Head: Normocephalic and atraumatic.   Right Ear: External ear normal.   Left Ear: External ear normal.   Mouth/Throat: Oral lesions present. No trismus in the jaw. Abnormal dentition. Dental  caries present. No uvula swelling. No oropharyngeal exudate or tonsillar abscesses.       DENTAL CARIES NOTED TO THIS REGION WITH MILD SWELLING BUT NO FLUCTUANCE OR DRAINABLE ABSCESS, GINGIVAL REGION SLIGHTLY ERYTHEMATOUS AND SWOLLEN   Eyes: Conjunctivae and EOM are normal. Pupils are equal, round, and reactive to light.   Neck: Neck supple. No thyromegaly present.   Normal range of motion.  Cardiovascular:  Normal rate, regular rhythm and normal heart sounds.           Pulmonary/Chest: Breath sounds normal. No respiratory distress. He has no wheezes. He has no rhonchi. He has no rales.   Abdominal: Abdomen is soft. Bowel sounds are normal.   Musculoskeletal:      Cervical back: Normal range of motion and neck supple.     Lymphadenopathy:     He has no cervical adenopathy.   Neurological: He is alert.   Skin: Skin is warm and dry. Capillary refill takes less than 2 seconds.   Psychiatric: He has a normal mood and affect. Thought content normal.       ED Course   Procedures  Labs Reviewed - No data to display       Imaging Results    None          Medications - No data to display  Medical Decision Making:   Initial Assessment:   PT WITH CHRONIC DENTAL CARIES AND BROKEN TEETH. WORSE X 2 DAYS.   Differential Diagnosis:   DENTAL ABSCESS  DENTAL CARIES  CHRONIC DENTAL FRACTURE    ED Management:  VSS, PT IN NAD   PT IN NAD, VSS, AFEBRILE, NO TRISMUS, NO VISUALIZED OR PALPATED FLUCTUANCE TO DRAIN IN ED. WILL PLACE ON CLINDA X 10 DAYS WITH PERIDEX DAILY USE AND NSAIDS FOR PAIN. PT HAS PCP TO FU WITH NEXT WEEK FOR RE-CHECK. HE ALSO HAS A DENTIST BUT HAS BEEN NONCOMPLIANT WITH ROUTINE DENTAL CARE. ADVISED PT SEE HIS DENTIST WITHIN NEXT WEEK FOR FURTHER MANAGEMENT. HE UNDERSTANDS TO RETURN TO ED SHOULD HIS SYMPTOMS WORSEN                         Clinical Impression:   Final diagnoses:  [K04.7] Dental abscess (Primary)      ED Disposition Condition    Discharge Stable          ED Prescriptions       Medication Sig Dispense  Start Date End Date Auth. Provider    clindamycin (CLEOCIN) 300 MG capsule Take 1 capsule (300 mg total) by mouth every 8 (eight) hours. for 10 days 30 capsule 9/17/2022 9/27/2022 APURVA Braga    chlorhexidine (PERIDEX) 0.12 % solution Use as directed 15 mLs in the mouth or throat 2 (two) times daily. for 14 days 420 mL 9/17/2022 10/1/2022 APURVA Braga    ibuprofen (ADVIL,MOTRIN) 800 MG tablet Take 1 tablet (800 mg total) by mouth 3 (three) times daily. 30 tablet 9/17/2022 -- APURVA Braga          Follow-up Information       Follow up With Specialties Details Why Contact Info    Debby Elizondo MD Family Medicine In 1 week  [address]  [place] [address]  [phone]      Debby Elizondo MD Family Medicine In 2 days  [address]  [place] [address]  [phone]               APURVA Braga  09/17/22 1020

## 2022-11-19 ENCOUNTER — HISTORICAL (OUTPATIENT)
Dept: ADMINISTRATIVE | Facility: HOSPITAL | Age: 32
End: 2022-11-19

## 2022-11-19 ENCOUNTER — HOSPITAL ENCOUNTER (EMERGENCY)
Facility: HOSPITAL | Age: 32
Discharge: LEFT BEFORE DISCHARGE | End: 2022-11-19

## 2022-11-19 VITALS
BODY MASS INDEX: 27.83 KG/M2 | HEIGHT: 72 IN | SYSTOLIC BLOOD PRESSURE: 127 MMHG | DIASTOLIC BLOOD PRESSURE: 86 MMHG | HEART RATE: 95 BPM | OXYGEN SATURATION: 100 % | RESPIRATION RATE: 18 BRPM | TEMPERATURE: 98 F | WEIGHT: 205.5 LBS

## 2022-11-19 LAB
ALBUMIN SERPL-MCNC: 3.9 GM/DL (ref 3.5–5)
ALBUMIN/GLOB SERPL: 1.1 RATIO (ref 1.1–2)
ALP SERPL-CCNC: 101 UNIT/L (ref 40–150)
ALT SERPL-CCNC: 49 UNIT/L (ref 0–55)
AST SERPL-CCNC: 34 UNIT/L (ref 5–34)
BASOPHILS # BLD AUTO: 0.03 X10(3)/MCL (ref 0–0.2)
BASOPHILS NFR BLD AUTO: 0.5 %
BILIRUBIN DIRECT+TOT PNL SERPL-MCNC: 1.1 MG/DL
BUN SERPL-MCNC: 11.7 MG/DL (ref 8.9–20.6)
CALCIUM SERPL-MCNC: 9.8 MG/DL (ref 8.4–10.2)
CHLORIDE SERPL-SCNC: 98 MMOL/L (ref 98–107)
CO2 SERPL-SCNC: 26 MMOL/L (ref 22–29)
CREAT SERPL-MCNC: 0.98 MG/DL (ref 0.73–1.18)
EOSINOPHIL # BLD AUTO: 0.11 X10(3)/MCL (ref 0–0.9)
EOSINOPHIL NFR BLD AUTO: 2 %
ERYTHROCYTE [DISTWIDTH] IN BLOOD BY AUTOMATED COUNT: 12 % (ref 11.5–17)
GFR SERPLBLD CREATININE-BSD FMLA CKD-EPI: >60 MLS/MIN/1.73/M2
GLOBULIN SER-MCNC: 3.5 GM/DL (ref 2.4–3.5)
GLUCOSE SERPL-MCNC: 100 MG/DL (ref 74–100)
HCT VFR BLD AUTO: 51.1 % (ref 42–52)
HGB BLD-MCNC: 17 GM/DL (ref 14–18)
IMM GRANULOCYTES # BLD AUTO: 0.01 X10(3)/MCL (ref 0–0.04)
IMM GRANULOCYTES NFR BLD AUTO: 0.2 %
LYMPHOCYTES # BLD AUTO: 1.55 X10(3)/MCL (ref 0.6–4.6)
LYMPHOCYTES NFR BLD AUTO: 28.3 %
MCH RBC QN AUTO: 28.9 PG (ref 27–31)
MCHC RBC AUTO-ENTMCNC: 33.3 MG/DL (ref 33–36)
MCV RBC AUTO: 86.8 FL (ref 80–94)
MONOCYTES # BLD AUTO: 0.86 X10(3)/MCL (ref 0.1–1.3)
MONOCYTES NFR BLD AUTO: 15.7 %
NEUTROPHILS # BLD AUTO: 2.9 X10(3)/MCL (ref 2.1–9.2)
NEUTROPHILS NFR BLD AUTO: 53.3 %
NRBC BLD AUTO-RTO: 0 %
PLATELET # BLD AUTO: 194 X10(3)/MCL (ref 130–400)
PMV BLD AUTO: 10.3 FL (ref 7.4–10.4)
POTASSIUM SERPL-SCNC: 4 MMOL/L (ref 3.5–5.1)
PROT SERPL-MCNC: 7.4 GM/DL (ref 6.4–8.3)
RBC # BLD AUTO: 5.89 X10(6)/MCL (ref 4.7–6.1)
SODIUM SERPL-SCNC: 137 MMOL/L (ref 136–145)
WBC # SPEC AUTO: 5.5 X10(3)/MCL (ref 4.5–11.5)

## 2022-11-19 PROCEDURE — 85025 COMPLETE CBC W/AUTO DIFF WBC: CPT | Performed by: EMERGENCY MEDICINE

## 2022-11-19 PROCEDURE — 99283 EMERGENCY DEPT VISIT LOW MDM: CPT | Mod: 25

## 2022-11-19 PROCEDURE — 80053 COMPREHEN METABOLIC PANEL: CPT | Performed by: EMERGENCY MEDICINE

## 2022-11-20 ENCOUNTER — HOSPITAL ENCOUNTER (EMERGENCY)
Facility: HOSPITAL | Age: 32
Discharge: HOME | End: 2022-11-20
Attending: FAMILY MEDICINE

## 2022-11-20 VITALS
HEART RATE: 95 BPM | OXYGEN SATURATION: 100 % | TEMPERATURE: 98 F | RESPIRATION RATE: 18 BRPM | DIASTOLIC BLOOD PRESSURE: 91 MMHG | WEIGHT: 205 LBS | SYSTOLIC BLOOD PRESSURE: 155 MMHG | BODY MASS INDEX: 27.77 KG/M2 | HEIGHT: 72 IN

## 2022-11-20 DIAGNOSIS — L03.317 CELLULITIS OF BUTTOCK: Primary | ICD-10-CM

## 2022-11-20 PROCEDURE — 25000003 PHARM REV CODE 250: Performed by: FAMILY MEDICINE

## 2022-11-20 PROCEDURE — 99284 EMERGENCY DEPT VISIT MOD MDM: CPT

## 2022-11-20 PROCEDURE — 25000003 PHARM REV CODE 250

## 2022-11-20 PROCEDURE — 96372 THER/PROPH/DIAG INJ SC/IM: CPT | Performed by: FAMILY MEDICINE

## 2022-11-20 PROCEDURE — 63600175 PHARM REV CODE 636 W HCPCS: Performed by: FAMILY MEDICINE

## 2022-11-20 RX ORDER — KETOROLAC TROMETHAMINE 10 MG/1
10 TABLET, FILM COATED ORAL EVERY 6 HOURS
Qty: 12 TABLET | Refills: 0 | Status: SHIPPED | OUTPATIENT
Start: 2022-11-20 | End: 2022-11-23

## 2022-11-20 RX ORDER — LIDOCAINE HYDROCHLORIDE 10 MG/ML
INJECTION INFILTRATION; PERINEURAL
Status: COMPLETED
Start: 2022-11-20 | End: 2022-11-20

## 2022-11-20 RX ORDER — KETOROLAC TROMETHAMINE 10 MG/1
10 TABLET, FILM COATED ORAL
Status: COMPLETED | OUTPATIENT
Start: 2022-11-20 | End: 2022-11-20

## 2022-11-20 RX ORDER — CEFTRIAXONE 1 G/1
1 INJECTION, POWDER, FOR SOLUTION INTRAMUSCULAR; INTRAVENOUS
Status: COMPLETED | OUTPATIENT
Start: 2022-11-20 | End: 2022-11-20

## 2022-11-20 RX ORDER — HYDROCORTISONE 25 MG/G
CREAM TOPICAL 2 TIMES DAILY
Qty: 28 G | Refills: 0 | Status: SHIPPED | OUTPATIENT
Start: 2022-11-20 | End: 2023-04-29 | Stop reason: ALTCHOICE

## 2022-11-20 RX ORDER — CLINDAMYCIN HYDROCHLORIDE 150 MG/1
300 CAPSULE ORAL 4 TIMES DAILY
Qty: 40 CAPSULE | Refills: 0 | Status: SHIPPED | OUTPATIENT
Start: 2022-11-20 | End: 2022-11-25

## 2022-11-20 RX ADMIN — CEFTRIAXONE SODIUM 1 G: 1 INJECTION, POWDER, FOR SOLUTION INTRAMUSCULAR; INTRAVENOUS at 09:11

## 2022-11-20 RX ADMIN — KETOROLAC TROMETHAMINE 10 MG: 10 TABLET, FILM COATED ORAL at 09:11

## 2022-11-20 RX ADMIN — LIDOCAINE HYDROCHLORIDE 100 MG: 10 INJECTION, SOLUTION INFILTRATION; PERINEURAL at 09:11

## 2022-11-21 NOTE — ED PROVIDER NOTES
Encounter Date: 11/20/2022       History     Chief Complaint   Patient presents with    Abscess     Patient reports that he have 2 abscess to his jose/rectal area. He went to ED 2 days ago and was given antibiotics but no rectal exam was completed. He reports hx of perineal abscess that was drained in February 2022 and was hospitalized for 3 days. C/o sharp stabbing pain 10/10 and difficulty sitting     Patient comes in with several abscesses throughout his skin evidence of staph infection stating that he has abscesses perirectal with evaluation patient has evidence of cellulitis at the area but otherwise patient states that he was placed on antibiotics even though he did not receive a rectal exam patient states previously in the beginning of the year was hospitalized for 3-6 days for surgical removal of abscesses.      Review of patient's allergies indicates:  No Known Allergies  No past medical history on file.  Past Surgical History:   Procedure Laterality Date    LAPAROSCOPIC CHOLECYSTECTOMY N/A 7/31/2022    Procedure: CHOLECYSTECTOMY, LAPAROSCOPIC;  Surgeon: Jai Kitchen Jr., MD;  Location: [facility];  Service: General;  Laterality: N/A;     No family history on file.     Review of Systems   Constitutional:  Negative for fever.   HENT:  Negative for sore throat.    Respiratory:  Negative for shortness of breath.    Cardiovascular:  Negative for chest pain.   Gastrointestinal:  Negative for nausea.   Genitourinary:  Negative for dysuria.   Musculoskeletal:  Negative for back pain.   Skin:  Positive for rash and wound.   Neurological:  Negative for weakness.   Hematological:  Does not bruise/bleed easily.     Physical Exam     Initial Vitals [11/20/22 2102]   BP Pulse Resp Temp SpO2   (!) 155/91 95 18 97.9 °F (36.6 °C) 100 %      MAP       --         Physical Exam    Nursing note and vitals reviewed.  Constitutional: He appears well-developed and well-nourished. He is not diaphoretic. No distress.   HENT:    Head: Normocephalic and atraumatic.   Right Ear: External ear normal.   Left Ear: External ear normal.   Nose: Nose normal.   Mouth/Throat: Oropharynx is clear and moist. No oropharyngeal exudate.   Eyes: Conjunctivae and EOM are normal. Pupils are equal, round, and reactive to light. Right eye exhibits no discharge. Left eye exhibits no discharge.   Neck: Neck supple. No thyromegaly present. No tracheal deviation present. No JVD present.   Normal range of motion.  Cardiovascular:  Normal rate, regular rhythm, normal heart sounds and intact distal pulses.     Exam reveals no gallop and no friction rub.       No murmur heard.  Pulmonary/Chest: Breath sounds normal. No stridor. No respiratory distress. He has no wheezes. He has no rhonchi. He has no rales. He exhibits no tenderness.   Abdominal: Abdomen is soft. Bowel sounds are normal. He exhibits no distension. There is no abdominal tenderness. There is no rebound and no guarding.   Musculoskeletal:         General: No tenderness or edema. Normal range of motion.      Cervical back: Normal range of motion and neck supple.     Lymphadenopathy:     He has no cervical adenopathy.   Neurological: He is alert and oriented to person, place, and time. He has normal strength and normal reflexes. No cranial nerve deficit or sensory deficit. GCS score is 15. GCS eye subscore is 4. GCS verbal subscore is 5. GCS motor subscore is 6.   Skin: Skin is warm and dry. Rash and abscess noted. No erythema.   Psychiatric: He has a normal mood and affect. His behavior is normal. Judgment and thought content normal.       ED Course   Procedures  Labs Reviewed - No data to display       Imaging Results    None          Medications   cefTRIAXone injection 1 g (has no administration in time range)   ketorolac tablet 10 mg (has no administration in time range)                              Clinical Impression:   Final diagnoses:  [L03.317] Cellulitis of buttock (Primary)      ED Disposition  Condition    Discharge Stable          ED Prescriptions       Medication Sig Dispense Start Date End Date Auth. Provider    clindamycin (CLEOCIN) 150 MG capsule Take 2 capsules (300 mg total) by mouth 4 (four) times daily. for 5 days 40 capsule 11/20/2022 11/25/2022 Jaleel Contreras MD    ketorolac (TORADOL) 10 mg tablet Take 1 tablet (10 mg total) by mouth every 6 (six) hours. for 3 days 12 tablet 11/20/2022 11/23/2022 Jaleel Contreras MD    hydrocortisone (ANUSOL-HC) 2.5 % rectal cream Place rectally 2 (two) times daily. 28 g 11/20/2022 -- Jaleel Contreras MD          Follow-up Information    None          Jaleel Contreras MD  11/20/22 2120

## 2023-01-21 ENCOUNTER — HOSPITAL ENCOUNTER (EMERGENCY)
Facility: HOSPITAL | Age: 33
Discharge: HOME | End: 2023-01-21
Attending: FAMILY MEDICINE
Payer: MEDICAID

## 2023-01-21 VITALS
SYSTOLIC BLOOD PRESSURE: 160 MMHG | TEMPERATURE: 98 F | BODY MASS INDEX: 29.8 KG/M2 | HEART RATE: 85 BPM | DIASTOLIC BLOOD PRESSURE: 90 MMHG | RESPIRATION RATE: 18 BRPM | HEIGHT: 72 IN | WEIGHT: 220 LBS | OXYGEN SATURATION: 99 %

## 2023-01-21 DIAGNOSIS — K04.7 DENTAL ABSCESS: Primary | ICD-10-CM

## 2023-01-21 PROCEDURE — 99284 EMERGENCY DEPT VISIT MOD MDM: CPT

## 2023-01-21 PROCEDURE — 96372 THER/PROPH/DIAG INJ SC/IM: CPT | Performed by: FAMILY MEDICINE

## 2023-01-21 PROCEDURE — 63600175 PHARM REV CODE 636 W HCPCS: Performed by: FAMILY MEDICINE

## 2023-01-21 RX ORDER — KETOROLAC TROMETHAMINE 30 MG/ML
60 INJECTION, SOLUTION INTRAMUSCULAR; INTRAVENOUS
Status: COMPLETED | OUTPATIENT
Start: 2023-01-21 | End: 2023-01-21

## 2023-01-21 RX ORDER — AMOXICILLIN AND CLAVULANATE POTASSIUM 875; 125 MG/1; MG/1
1 TABLET, FILM COATED ORAL 2 TIMES DAILY
Qty: 28 TABLET | Refills: 0 | Status: SHIPPED | OUTPATIENT
Start: 2023-01-21 | End: 2023-02-04

## 2023-01-21 RX ORDER — KETOROLAC TROMETHAMINE 10 MG/1
10 TABLET, FILM COATED ORAL EVERY 6 HOURS
Qty: 12 TABLET | Refills: 0 | Status: SHIPPED | OUTPATIENT
Start: 2023-01-21 | End: 2023-01-24

## 2023-01-21 RX ADMIN — KETOROLAC TROMETHAMINE 60 MG: 30 INJECTION, SOLUTION INTRAMUSCULAR; INTRAVENOUS at 10:01

## 2023-01-22 NOTE — ED PROVIDER NOTES
Encounter Date: 1/21/2023       History     Chief Complaint   Patient presents with    Dental Pain     Pt complaints of right sided dental pain that started on yesterday. Pt states he may have a cracked tooth and a possible abscess     32-year-old comes in with complaint of tooth pain right lower molar otherwise patient has no nausea no vomiting no diarrhea no fever chills or night sweats chronic condition of poor dentition probably contributing to patient's pain otherwise patient has no new complaints.      Review of patient's allergies indicates:  No Known Allergies  No past medical history on file.  Past Surgical History:   Procedure Laterality Date    LAPAROSCOPIC CHOLECYSTECTOMY N/A 7/31/2022    Procedure: CHOLECYSTECTOMY, LAPAROSCOPIC;  Surgeon: Jai Kitchen Jr., MD;  Location: [facility];  Service: General;  Laterality: N/A;     No family history on file.     Review of Systems   Constitutional:  Negative for fever.   HENT:  Positive for dental problem. Negative for sore throat.    Respiratory:  Negative for shortness of breath.    Cardiovascular:  Negative for chest pain.   Gastrointestinal:  Negative for nausea.   Genitourinary:  Negative for dysuria.   Musculoskeletal:  Negative for back pain.   Skin:  Negative for rash.   Neurological:  Negative for weakness.   Hematological:  Does not bruise/bleed easily.   All other systems reviewed and are negative.    Physical Exam     Initial Vitals [01/21/23 2201]   BP Pulse Resp Temp SpO2   (!) 177/115 82 20 97.7 °F (36.5 °C) 98 %      MAP       --         Physical Exam    Nursing note and vitals reviewed.  Constitutional: He appears well-developed and well-nourished. He is not diaphoretic. No distress.   HENT:   Head: Normocephalic and atraumatic.   Right Ear: External ear normal.   Left Ear: External ear normal.   Nose: Nose normal.   Mouth/Throat: Oropharynx is clear and moist. No oropharyngeal exudate.   Poor dentition   Eyes: Conjunctivae and EOM are normal.  Pupils are equal, round, and reactive to light. Right eye exhibits no discharge. Left eye exhibits no discharge.   Neck: Neck supple. No thyromegaly present. No tracheal deviation present. No JVD present.   Normal range of motion.  Cardiovascular:  Normal rate, regular rhythm, normal heart sounds and intact distal pulses.     Exam reveals no gallop and no friction rub.       No murmur heard.  Pulmonary/Chest: Breath sounds normal. No stridor. No respiratory distress. He has no wheezes. He has no rhonchi. He has no rales. He exhibits no tenderness.   Abdominal: Abdomen is soft. Bowel sounds are normal. He exhibits no distension. There is no abdominal tenderness. There is no rebound and no guarding.   Musculoskeletal:         General: No tenderness or edema. Normal range of motion.      Cervical back: Normal range of motion and neck supple.     Lymphadenopathy:     He has no cervical adenopathy.   Neurological: He is alert and oriented to person, place, and time. He has normal strength and normal reflexes. No cranial nerve deficit or sensory deficit. GCS score is 15. GCS eye subscore is 4. GCS verbal subscore is 5. GCS motor subscore is 6.   Skin: Skin is warm and dry. No rash and no abscess noted. No erythema.   Psychiatric: He has a normal mood and affect. His behavior is normal. Judgment and thought content normal.       ED Course   Procedures  Labs Reviewed - No data to display       Imaging Results    None          Medications   ketorolac injection 60 mg (has no administration in time range)     Medical Decision Making:   Differential Diagnosis:   Root canal cavity broken tooth fractured tooth dental abscess                        Clinical Impression:   Final diagnoses:  [K04.7] Dental abscess (Primary)        ED Disposition Condition    Discharge Stable          ED Prescriptions       Medication Sig Dispense Start Date End Date Auth. Provider    amoxicillin-clavulanate 875-125mg (AUGMENTIN) 875-125 mg per tablet  Take 1 tablet by mouth 2 (two) times daily. for 14 days 28 tablet 1/21/2023 2/4/2023 Jaleel Contreras MD    ketorolac (TORADOL) 10 mg tablet Take 1 tablet (10 mg total) by mouth every 6 (six) hours. for 3 days 12 tablet 1/21/2023 1/24/2023 Jaleel Contreras MD          Follow-up Information    None          Jaleel Contreras MD  01/21/23 8946

## 2023-04-29 ENCOUNTER — HOSPITAL ENCOUNTER (EMERGENCY)
Facility: HOSPITAL | Age: 33
Discharge: HOME | End: 2023-04-29
Attending: EMERGENCY MEDICINE
Payer: COMMERCIAL

## 2023-04-29 VITALS
HEART RATE: 81 BPM | RESPIRATION RATE: 20 BRPM | TEMPERATURE: 98 F | DIASTOLIC BLOOD PRESSURE: 82 MMHG | SYSTOLIC BLOOD PRESSURE: 139 MMHG | OXYGEN SATURATION: 100 %
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
## 2023-04-29 DIAGNOSIS — M25.511 ACUTE PAIN OF RIGHT SHOULDER: ICD-10-CM

## 2023-04-29 DIAGNOSIS — T07.XXXA ABRASIONS OF MULTIPLE SITES: Primary | ICD-10-CM

## 2023-04-29 PROCEDURE — 99284 EMERGENCY DEPT VISIT MOD MDM: CPT

## 2023-04-29 RX ORDER — HYDROCODONE BITARTRATE AND ACETAMINOPHEN 10; 325 MG/1; MG/1
1 TABLET ORAL EVERY 6 HOURS PRN
Qty: 12 TABLET | Refills: 0 | Status: SHIPPED | OUTPATIENT
Start: 2023-04-29 | End: 2023-05-03 | Stop reason: SDUPTHER
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
## 2023-05-03 ENCOUNTER — HOSPITAL ENCOUNTER (EMERGENCY)
Facility: HOSPITAL | Age: 33
Discharge: HOME | End: 2023-05-03
Attending: FAMILY MEDICINE
Payer: COMMERCIAL

## 2023-05-03 VITALS
BODY MASS INDEX: 29.84 KG/M2 | HEART RATE: 94 BPM | DIASTOLIC BLOOD PRESSURE: 79 MMHG | TEMPERATURE: 98 F | SYSTOLIC BLOOD PRESSURE: 138 MMHG | RESPIRATION RATE: 18 BRPM | OXYGEN SATURATION: 99 % | WEIGHT: 220 LBS

## 2023-05-03 DIAGNOSIS — M25.519 SHOULDER PAIN, UNSPECIFIED CHRONICITY, UNSPECIFIED LATERALITY: Primary | ICD-10-CM

## 2023-05-03 PROCEDURE — 63600175 PHARM REV CODE 636 W HCPCS: Performed by: FAMILY MEDICINE

## 2023-05-03 PROCEDURE — 96372 THER/PROPH/DIAG INJ SC/IM: CPT | Performed by: FAMILY MEDICINE

## 2023-05-03 PROCEDURE — 99285 EMERGENCY DEPT VISIT HI MDM: CPT | Mod: 25

## 2023-05-03 RX ORDER — CYCLOBENZAPRINE HCL 10 MG
10 TABLET ORAL 3 TIMES DAILY PRN
Qty: 15 TABLET | Refills: 0 | Status: SHIPPED | OUTPATIENT
Start: 2023-05-03 | End: 2023-05-08

## 2023-05-03 RX ORDER — DICLOFENAC SODIUM 50 MG/1
50 TABLET, DELAYED RELEASE ORAL 3 TIMES DAILY
Qty: 9 TABLET | Refills: 0 | Status: SHIPPED | OUTPATIENT
Start: 2023-05-03 | End: 2023-05-06

## 2023-05-03 RX ORDER — SILVER SULFADIAZINE 10 G/1000G
CREAM TOPICAL 2 TIMES DAILY
Qty: 50 G | Refills: 0 | Status: ON HOLD | OUTPATIENT
Start: 2023-05-03 | End: 2023-05-25 | Stop reason: HOSPADM

## 2023-05-03 RX ORDER — KETOROLAC TROMETHAMINE 30 MG/ML
60 INJECTION, SOLUTION INTRAMUSCULAR; INTRAVENOUS
Status: COMPLETED | OUTPATIENT
Start: 2023-05-03 | End: 2023-05-03

## 2023-05-03 RX ORDER — HYDROCODONE BITARTRATE AND ACETAMINOPHEN 10; 325 MG/1; MG/1
1 TABLET ORAL EVERY 6 HOURS PRN
Qty: 12 TABLET | Refills: 0 | Status: SHIPPED | OUTPATIENT
Start: 2023-05-03 | End: 2023-05-18 | Stop reason: SDUPTHER

## 2023-05-03 RX ORDER — CEPHALEXIN 500 MG/1
500 CAPSULE ORAL 4 TIMES DAILY
Qty: 20 CAPSULE | Refills: 0 | Status: SHIPPED | OUTPATIENT
Start: 2023-05-03 | End: 2023-05-08

## 2023-05-03 RX ADMIN — KETOROLAC TROMETHAMINE 60 MG: 30 INJECTION, SOLUTION INTRAMUSCULAR; INTRAVENOUS at 04:05
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
## 2023-05-18 ENCOUNTER — HOSPITAL ENCOUNTER (EMERGENCY)
Facility: HOSPITAL | Age: 33
Discharge: HOME | End: 2023-05-18
Attending: EMERGENCY MEDICINE
Payer: MEDICAID

## 2023-05-18 VITALS
BODY MASS INDEX: 29.8 KG/M2 | WEIGHT: 220 LBS | TEMPERATURE: 98 F | HEART RATE: 100 BPM | RESPIRATION RATE: 18 BRPM | SYSTOLIC BLOOD PRESSURE: 159 MMHG | HEIGHT: 72 IN | DIASTOLIC BLOOD PRESSURE: 93 MMHG

## 2023-05-18 DIAGNOSIS — M79.644 FINGER PAIN, RIGHT: Primary | ICD-10-CM

## 2023-05-18 PROCEDURE — 26010 DRAINAGE OF FINGER ABSCESS: CPT | Mod: F6

## 2023-05-18 PROCEDURE — 25000003 PHARM REV CODE 250

## 2023-05-18 PROCEDURE — 90471 IMMUNIZATION ADMIN: CPT

## 2023-05-18 PROCEDURE — 99284 EMERGENCY DEPT VISIT MOD MDM: CPT | Mod: 25

## 2023-05-18 PROCEDURE — 63600175 PHARM REV CODE 636 W HCPCS

## 2023-05-18 PROCEDURE — 90715 TDAP VACCINE 7 YRS/> IM: CPT

## 2023-05-18 RX ORDER — TRAMADOL HYDROCHLORIDE 50 MG/1
50 TABLET ORAL EVERY 6 HOURS PRN
Qty: 12 TABLET | Refills: 0 | Status: ON HOLD | OUTPATIENT
Start: 2023-05-18 | End: 2023-05-25 | Stop reason: HOSPADM

## 2023-05-18 RX ORDER — CLINDAMYCIN HYDROCHLORIDE 150 MG/1
300 CAPSULE ORAL 4 TIMES DAILY
Qty: 56 CAPSULE | Refills: 0 | Status: ON HOLD | OUTPATIENT
Start: 2023-05-18 | End: 2023-05-25 | Stop reason: HOSPADM

## 2023-05-18 RX ORDER — HYDROCODONE BITARTRATE AND ACETAMINOPHEN 10; 325 MG/1; MG/1
1 TABLET ORAL
Status: COMPLETED | OUTPATIENT
Start: 2023-05-18 | End: 2023-05-18

## 2023-05-18 RX ORDER — LIDOCAINE HYDROCHLORIDE 10 MG/ML
5 INJECTION, SOLUTION EPIDURAL; INFILTRATION; INTRACAUDAL; PERINEURAL
Status: COMPLETED | OUTPATIENT
Start: 2023-05-18 | End: 2023-05-18

## 2023-05-18 RX ADMIN — LIDOCAINE HYDROCHLORIDE 60 MG: 10 INJECTION, SOLUTION EPIDURAL; INFILTRATION; INTRACAUDAL; PERINEURAL at 06:05

## 2023-05-18 RX ADMIN — HYDROCODONE BITARTRATE AND ACETAMINOPHEN 1 TABLET: 10; 325 TABLET ORAL at 06:05

## 2023-05-18 RX ADMIN — TETANUS TOXOID, REDUCED DIPHTHERIA TOXOID AND ACELLULAR PERTUSSIS VACCINE, ADSORBED 0.5 ML: 5; 2.5; 8; 8; 2.5 SUSPENSION INTRAMUSCULAR at 06:05

## 2023-05-18 NOTE — DISCHARGE INSTRUCTIONS
Patient will be discharged home.  Patient instructed to follow up with primary care provider for recheck.  Take antibiotics as prescribed.  Check capillary refill and able to bend finger.  Soak finger in Epsom salt twice a day.

## 2023-05-18 NOTE — ED PROVIDER NOTES
Encounter Date: 5/18/2023       History     Chief Complaint   Patient presents with    Hand Pain     Complains of redness and swelling to right 2nd finger x 2 days. Reports he stuck his hand in a spare tire and thinks something bit him     Awake alert and oriented 32-year-old male presents to the emergency room with a infected abscess to right index finger.  Patient denies any history on any meds.      Review of patient's allergies indicates:  No Known Allergies  History reviewed. No pertinent past medical history.  Past Surgical History:   Procedure Laterality Date    LAPAROSCOPIC CHOLECYSTECTOMY N/A 7/31/2022    Procedure: CHOLECYSTECTOMY, LAPAROSCOPIC;  Surgeon: Jai Kitchen Jr., MD;  Location: [facility];  Service: General;  Laterality: N/A;     No family history on file.  Social History     Tobacco Use    Smoking status: Every Day     Types: Cigarettes    Smokeless tobacco: Never     Review of Systems   Constitutional: Negative.    HENT: Negative.     Eyes: Negative.    Respiratory: Negative.     Cardiovascular: Negative.    Gastrointestinal: Negative.    Endocrine: Negative.    Genitourinary: Negative.    Musculoskeletal:  Positive for joint swelling.   Skin:  Positive for wound.   Allergic/Immunologic: Negative.    Neurological: Negative.    Hematological: Negative.    Psychiatric/Behavioral: Negative.       Physical Exam     Initial Vitals [05/18/23 1732]   BP Pulse Resp Temp SpO2   (!) 159/93 100 18 98.2 °F (36.8 °C) --      MAP       --         Physical Exam    Nursing note and vitals reviewed.  Constitutional: He appears well-developed and well-nourished.   HENT:   Head: Normocephalic and atraumatic.   Right Ear: External ear normal.   Left Ear: External ear normal.   Nose: Nose normal.   Mouth/Throat: Oropharynx is clear and moist.   Eyes: Conjunctivae and EOM are normal. Pupils are equal, round, and reactive to light.   Neck: Neck supple.   Normal range of motion.  Cardiovascular:  Normal rate, regular  rhythm, normal heart sounds and intact distal pulses.           Pulmonary/Chest: Breath sounds normal.   Abdominal: Abdomen is soft. Bowel sounds are normal.   Musculoskeletal:         General: Normal range of motion.      Cervical back: Normal range of motion and neck supple.     Neurological: He is alert and oriented to person, place, and time. He has normal strength and normal reflexes. GCS score is 15. GCS eye subscore is 4. GCS verbal subscore is 5. GCS motor subscore is 6.   Skin: Skin is warm and dry. Capillary refill takes less than 2 seconds. Abscess noted.   Psychiatric: He has a normal mood and affect. His behavior is normal. Judgment and thought content normal.       ED Course   I & D - Incision and Drainage    Date/Time: 5/18/2023 7:05 PM  Location procedure was performed: [facility] EMERGENCY DEPARTMENT  Performed by: Aleah Carreon NP  Authorized by: Anmol Estrada MD   Consent Done: Emergent Situation  Type: abscess  Body area: upper extremity  Location details: right index finger  Anesthesia: local infiltration    Anesthesia:  Local Anesthetic: lidocaine 1% with epinephrine  Anesthetic total: 3 mL    Patient sedated: no  Description of findings: 1 cm red, indurated, draining abscess to right index finger   Scalpel size: 11  Incision type: single straight  Incision depth: dermal  Complexity: simple  Drainage: pus  Drainage amount: scant  Wound treatment: incision and expression of material  Patient tolerance: Patient tolerated the procedure well with no immediate complications    Incision depth: dermal      Labs Reviewed - No data to display       Imaging Results    None          Medications   HYDROcodone-acetaminophen  mg per tablet 1 tablet (1 tablet Oral Given 5/18/23 1808)   Tdap (BOOSTRIX) vaccine injection 0.5 mL (0.5 mLs Intramuscular Given 5/18/23 1808)   LIDOcaine (PF) 10 mg/ml (1%) injection 50 mg (60 mg Infiltration Given 5/18/23 1808)     Medical Decision Making:   Initial Assessment:    Awake alert and oriented 32-year-old male presents to the emergency room with and swelling/abscess to the right great finger.  Patient reports he believes it was cut when he stuck his finger in a bike spoke.  Ambulatory gait steady.  Vital signs stable afebrile.  Right index finger red swollen tender with drainage.  Onset of injury 2 days ago.  Capillary refill less than 3.  Patient is able to flex finger at joint.  Patient is able to do the fan finger.  Tenderness to palpation positive erythema positive induration positive fluctuance.  Wound is 1 cm and around 0.5 mm in depth.  No streaking.  No tissue loss.  No foreign body appreciated.  Distal pulse present.  Patient was updated on his tetanus shot.  Differential Diagnosis:   Cellulitis  abscess  ED Management:  Patient was informed of the procedure.  Pain medication given prior to the procedure verbalized understanding.  Wound cleaned with iodine, sterile saline.  Post procedure patient's finger was cleaned triple antibiotic applied and dressed.  Patient informed to keep dressing on for 24 hours.  Epsom salt soaks twice a day and complete antibiotics as prescribed.                        Clinical Impression:   Final diagnoses:  [M79.644] Finger pain, right (Primary)        ED Disposition Condition    Discharge Stable          ED Prescriptions       Medication Sig Dispense Start Date End Date Auth. Provider    clindamycin (CLEOCIN) 150 MG capsule Take 2 capsules (300 mg total) by mouth 4 (four) times daily. for 7 days 56 capsule 5/18/2023 5/25/2023 Aleah Carreon NP    traMADoL (ULTRAM) 50 mg tablet Take 1 tablet (50 mg total) by mouth every 6 (six) hours as needed for Pain. 12 tablet 5/18/2023 5/24/2023 Aleah Carreon NP          Follow-up Information       Follow up With Specialties Details Why Contact Info    Debby Elizondo MD Family Medicine  For wound re-check [address] [facility] [address]  [phone]               Aleah Carreon NP  05/18/23  1907

## 2023-05-19 ENCOUNTER — HOSPITAL ENCOUNTER (EMERGENCY)
Facility: HOSPITAL | Age: 33
Discharge: HOME | End: 2023-05-19
Attending: STUDENT IN AN ORGANIZED HEALTH CARE EDUCATION/TRAINING PROGRAM
Payer: MEDICAID

## 2023-05-19 VITALS
HEIGHT: 72 IN | TEMPERATURE: 98 F | SYSTOLIC BLOOD PRESSURE: 150 MMHG | DIASTOLIC BLOOD PRESSURE: 92 MMHG | HEART RATE: 96 BPM | RESPIRATION RATE: 20 BRPM | OXYGEN SATURATION: 100 % | BODY MASS INDEX: 29.8 KG/M2 | WEIGHT: 220 LBS

## 2023-05-19 DIAGNOSIS — L02.91 ABSCESS: Primary | ICD-10-CM

## 2023-05-19 PROCEDURE — 25000003 PHARM REV CODE 250: Performed by: STUDENT IN AN ORGANIZED HEALTH CARE EDUCATION/TRAINING PROGRAM

## 2023-05-19 PROCEDURE — 99284 EMERGENCY DEPT VISIT MOD MDM: CPT | Mod: 25

## 2023-05-19 PROCEDURE — 63600175 PHARM REV CODE 636 W HCPCS: Performed by: STUDENT IN AN ORGANIZED HEALTH CARE EDUCATION/TRAINING PROGRAM

## 2023-05-19 PROCEDURE — 96374 THER/PROPH/DIAG INJ IV PUSH: CPT

## 2023-05-19 RX ORDER — CLINDAMYCIN PHOSPHATE 150 MG/ML
600 INJECTION, SOLUTION INTRAVENOUS
Status: DISCONTINUED | OUTPATIENT
Start: 2023-05-19 | End: 2023-05-19

## 2023-05-19 RX ORDER — BACITRACIN ZINC 500 UNIT/G
OINTMENT (GRAM) TOPICAL 2 TIMES DAILY
Qty: 30 G | Refills: 0 | Status: ON HOLD | OUTPATIENT
Start: 2023-05-19 | End: 2023-05-25 | Stop reason: HOSPADM

## 2023-05-19 RX ORDER — TRAMADOL HYDROCHLORIDE 50 MG/1
50 TABLET ORAL
Status: COMPLETED | OUTPATIENT
Start: 2023-05-19 | End: 2023-05-19

## 2023-05-19 RX ADMIN — VANCOMYCIN HYDROCHLORIDE 1500 MG: 1.5 INJECTION, POWDER, LYOPHILIZED, FOR SOLUTION INTRAVENOUS at 04:05

## 2023-05-19 RX ADMIN — TRAMADOL HYDROCHLORIDE 50 MG: 50 TABLET, COATED ORAL at 05:05

## 2023-05-19 NOTE — DISCHARGE INSTRUCTIONS
Keep wound clean and dry  Clean with antibacterial soap and water  Keep covered   Take all medications as prescribed

## 2023-05-19 NOTE — ED PROVIDER NOTES
Encounter Date: 5/19/2023       History     Chief Complaint   Patient presents with    Abscess     Abscess to R hand index finger --pt seen in ER yesterday but pain, swelling and redness has increased      32-year-old male presents to ED for recheck of abscess to right index finger.  Patient seen in ER and abscess was incised and drained.  Patient states that he has been compliant with prescribed clindamycin.  States that he took off the dressing when he got home before shower and has that covered it today but denies that the wound has gotten contaminated or dirty.  Denies any fever chills or excessive drainage    Review of patient's allergies indicates:  No Known Allergies  History reviewed. No pertinent past medical history.  Past Surgical History:   Procedure Laterality Date    LAPAROSCOPIC CHOLECYSTECTOMY N/A 7/31/2022    Procedure: CHOLECYSTECTOMY, LAPAROSCOPIC;  Surgeon: Jai Kitchen Jr., MD;  Location: [facility];  Service: General;  Laterality: N/A;     History reviewed. No pertinent family history.  Social History     Tobacco Use    Smoking status: Every Day     Types: Cigarettes    Smokeless tobacco: Never     Review of Systems   Constitutional:  Negative for fever.   HENT:  Negative for sore throat.    Respiratory:  Negative for shortness of breath.    Cardiovascular:  Negative for chest pain.   Gastrointestinal:  Negative for nausea.   Genitourinary:  Negative for dysuria.   Musculoskeletal:  Negative for back pain.   Skin:  Positive for wound. Negative for rash.   Neurological:  Negative for weakness.   Hematological:  Does not bruise/bleed easily.     Physical Exam     Initial Vitals [05/19/23 1612]   BP Pulse Resp Temp SpO2   (!) 150/92 96 18 97.8 °F (36.6 °C) 100 %      MAP       --         Physical Exam    Nursing note and vitals reviewed.  Constitutional: He appears well-developed and well-nourished.   HENT:   Head: Normocephalic.   Eyes: EOM are normal. Pupils are equal, round, and reactive to  light.   Neck:   Normal range of motion.  Cardiovascular:  Normal rate, regular rhythm and normal pulses.           Pulmonary/Chest: Breath sounds normal. No respiratory distress.   Abdominal: Abdomen is soft. Bowel sounds are normal. There is no abdominal tenderness.   Musculoskeletal:         General: Tenderness and edema (with erythema, indurated tissue to right index finger overlying PIP joint; drainage site with small amount of pus however none able to be expressed from wound, no fluctuance or drainable fluid collection apparent) present. Normal range of motion.      Cervical back: Normal range of motion.     Neurological: He is alert.   Skin: Skin is warm. Capillary refill takes less than 2 seconds.   Psychiatric: He has a normal mood and affect.       ED Course   Procedures  Labs Reviewed - No data to display       Imaging Results    None          Medications   vancomycin 1,500 mg in dextrose 5 % (D5W) 250 mL IVPB (Vial-Mate) (has no administration in time range)     Medical Decision Making:   Differential Diagnosis:   Cellulitis, wound infection, abscess cellulitis, abscess, wound infection  ED Management:  Patient given dose of IV vancomycin in ER, clindamycin unavailable.  Patient came in with wound 9 covered and does not seem keeping the wound clean.  I did not see the wound when he initially presented on yesterday however patient states that it has worsened.  No additional draining both fluid collection on exam today.  Patient is stable condition otherwise no fevers or chills or concerns for systemic infection or sepsis.  Patient advised to follow up with PCP for wound check on tomorrow.  Take all medications as prescribed also prescribed additional antibiotic ointment for use reinforced wound care instructions.  ER precautions reviewed.                        Clinical Impression:   Final diagnoses:  [L02.91] Abscess (Primary)        ED Disposition Condition    Discharge Stable          ED Prescriptions        Medication Sig Dispense Start Date End Date Auth. Provider    bacitracin 500 unit/gram Oint Apply topically 2 (two) times daily. 30 g 5/19/2023 -- Cindy Grover MD          Follow-up Information       Follow up With Specialties Details Why Contact Info    Debby Elizondo MD Family Medicine Schedule an appointment as soon as possible for a visit in 1 day  [address] [place] [id number]  [phone]               Cindy Grover MD  05/19/23 2107

## 2023-05-22 ENCOUNTER — HOSPITAL ENCOUNTER (INPATIENT)
Facility: HOSPITAL | Age: 33
LOS: 3 days | Discharge: HOME HEALTH | DRG: 603 | End: 2023-05-25
Attending: STUDENT IN AN ORGANIZED HEALTH CARE EDUCATION/TRAINING PROGRAM | Admitting: INTERNAL MEDICINE
Payer: MEDICAID

## 2023-05-22 DIAGNOSIS — L03.011 CELLULITIS OF FINGER OF RIGHT HAND: Primary | ICD-10-CM

## 2023-05-22 DIAGNOSIS — R07.9 CHEST PAIN: ICD-10-CM

## 2023-05-22 DIAGNOSIS — L03.90 CELLULITIS: ICD-10-CM

## 2023-05-22 LAB
ALBUMIN SERPL-MCNC: 3.8 G/DL (ref 3.5–5)
ALBUMIN/GLOB SERPL: 1 RATIO (ref 1.1–2)
ALP SERPL-CCNC: 100 UNIT/L (ref 40–150)
ALT SERPL-CCNC: 46 UNIT/L (ref 0–55)
AST SERPL-CCNC: 31 UNIT/L (ref 5–34)
BASOPHILS # BLD AUTO: 0.04 X10(3)/MCL
BASOPHILS NFR BLD AUTO: 0.4 %
BILIRUBIN DIRECT+TOT PNL SERPL-MCNC: 0.8 MG/DL
BUN SERPL-MCNC: 11.9 MG/DL (ref 8.9–20.6)
CALCIUM SERPL-MCNC: 9.8 MG/DL (ref 8.4–10.2)
CHLORIDE SERPL-SCNC: 100 MMOL/L (ref 98–107)
CO2 SERPL-SCNC: 26 MMOL/L (ref 22–29)
CREAT SERPL-MCNC: 0.83 MG/DL (ref 0.73–1.18)
CRP SERPL-MCNC: 26.4 MG/L
EOSINOPHIL # BLD AUTO: 0.06 X10(3)/MCL (ref 0–0.9)
EOSINOPHIL NFR BLD AUTO: 0.6 %
ERYTHROCYTE [DISTWIDTH] IN BLOOD BY AUTOMATED COUNT: 12.3 % (ref 11.5–17)
ERYTHROCYTE [SEDIMENTATION RATE] IN BLOOD: 27 MM/HR (ref 0–15)
EST. AVERAGE GLUCOSE BLD GHB EST-MCNC: 96.8 MG/DL
GFR SERPLBLD CREATININE-BSD FMLA CKD-EPI: >60 MLS/MIN/1.73/M2
GLOBULIN SER-MCNC: 3.8 GM/DL (ref 2.4–3.5)
GLUCOSE SERPL-MCNC: 73 MG/DL (ref 74–100)
HAV IGM SERPL QL IA: NONREACTIVE
HBA1C MFR BLD: 5 %
HBV CORE IGM SERPL QL IA: NONREACTIVE
HBV SURFACE AG SERPL QL IA: NONREACTIVE
HCT VFR BLD AUTO: 49.6 % (ref 42–52)
HCV AB SERPL QL IA: NONREACTIVE
HGB BLD-MCNC: 16.4 G/DL (ref 14–18)
HIV 1+2 AB+HIV1 P24 AG SERPL QL IA: NONREACTIVE
IMM GRANULOCYTES # BLD AUTO: 0.05 X10(3)/MCL (ref 0–0.04)
IMM GRANULOCYTES NFR BLD AUTO: 0.5 %
LACTATE SERPL-SCNC: 1 MMOL/L (ref 0.5–2.2)
LYMPHOCYTES # BLD AUTO: 1.96 X10(3)/MCL (ref 0.6–4.6)
LYMPHOCYTES NFR BLD AUTO: 20.7 %
MAGNESIUM SERPL-MCNC: 2.2 MG/DL (ref 1.6–2.6)
MCH RBC QN AUTO: 28.9 PG (ref 27–31)
MCHC RBC AUTO-ENTMCNC: 33.1 G/DL (ref 33–36)
MCV RBC AUTO: 87.3 FL (ref 80–94)
MONOCYTES # BLD AUTO: 1.16 X10(3)/MCL (ref 0.1–1.3)
MONOCYTES NFR BLD AUTO: 12.2 %
NEUTROPHILS # BLD AUTO: 6.21 X10(3)/MCL (ref 2.1–9.2)
NEUTROPHILS NFR BLD AUTO: 65.6 %
NRBC BLD AUTO-RTO: 0 %
PLATELET # BLD AUTO: 291 X10(3)/MCL (ref 130–400)
PMV BLD AUTO: 9.7 FL (ref 7.4–10.4)
POTASSIUM SERPL-SCNC: 4.4 MMOL/L (ref 3.5–5.1)
PROT SERPL-MCNC: 7.6 GM/DL (ref 6.4–8.3)
RBC # BLD AUTO: 5.68 X10(6)/MCL (ref 4.7–6.1)
SODIUM SERPL-SCNC: 136 MMOL/L (ref 136–145)
T PALLIDUM AB SER QL: NONREACTIVE
WBC # SPEC AUTO: 9.48 X10(3)/MCL (ref 4.5–11.5)

## 2023-05-22 PROCEDURE — 25000003 PHARM REV CODE 250: Performed by: INTERNAL MEDICINE

## 2023-05-22 PROCEDURE — 25000003 PHARM REV CODE 250: Performed by: STUDENT IN AN ORGANIZED HEALTH CARE EDUCATION/TRAINING PROGRAM

## 2023-05-22 PROCEDURE — 63600175 PHARM REV CODE 636 W HCPCS: Performed by: INTERNAL MEDICINE

## 2023-05-22 PROCEDURE — 96372 THER/PROPH/DIAG INJ SC/IM: CPT | Performed by: INTERNAL MEDICINE

## 2023-05-22 PROCEDURE — 25000003 PHARM REV CODE 250: Performed by: ORTHOPAEDIC SURGERY

## 2023-05-22 PROCEDURE — 99285 EMERGENCY DEPT VISIT HI MDM: CPT | Mod: 25

## 2023-05-22 PROCEDURE — 86780 TREPONEMA PALLIDUM: CPT | Performed by: INTERNAL MEDICINE

## 2023-05-22 PROCEDURE — 99284 PR EMERGENCY DEPT VISIT,LEVEL IV: ICD-10-PCS | Mod: 25,,, | Performed by: NURSE PRACTITIONER

## 2023-05-22 PROCEDURE — 83735 ASSAY OF MAGNESIUM: CPT | Performed by: STUDENT IN AN ORGANIZED HEALTH CARE EDUCATION/TRAINING PROGRAM

## 2023-05-22 PROCEDURE — 96365 THER/PROPH/DIAG IV INF INIT: CPT

## 2023-05-22 PROCEDURE — 83036 HEMOGLOBIN GLYCOSYLATED A1C: CPT | Performed by: INTERNAL MEDICINE

## 2023-05-22 PROCEDURE — 26010 INCISION AND DRAINAGE: ICD-10-PCS | Mod: F6,,, | Performed by: NURSE PRACTITIONER

## 2023-05-22 PROCEDURE — 86140 C-REACTIVE PROTEIN: CPT | Performed by: INTERNAL MEDICINE

## 2023-05-22 PROCEDURE — 87389 HIV-1 AG W/HIV-1&-2 AB AG IA: CPT | Performed by: INTERNAL MEDICINE

## 2023-05-22 PROCEDURE — 99284 EMERGENCY DEPT VISIT MOD MDM: CPT | Mod: 25,,, | Performed by: NURSE PRACTITIONER

## 2023-05-22 PROCEDURE — 11000001 HC ACUTE MED/SURG PRIVATE ROOM

## 2023-05-22 PROCEDURE — 80053 COMPREHEN METABOLIC PANEL: CPT | Performed by: STUDENT IN AN ORGANIZED HEALTH CARE EDUCATION/TRAINING PROGRAM

## 2023-05-22 PROCEDURE — 26010 DRAINAGE OF FINGER ABSCESS: CPT | Mod: F6,,, | Performed by: NURSE PRACTITIONER

## 2023-05-22 PROCEDURE — 63600175 PHARM REV CODE 636 W HCPCS: Performed by: STUDENT IN AN ORGANIZED HEALTH CARE EDUCATION/TRAINING PROGRAM

## 2023-05-22 PROCEDURE — 87040 BLOOD CULTURE FOR BACTERIA: CPT | Performed by: STUDENT IN AN ORGANIZED HEALTH CARE EDUCATION/TRAINING PROGRAM

## 2023-05-22 PROCEDURE — G0378 HOSPITAL OBSERVATION PER HR: HCPCS

## 2023-05-22 PROCEDURE — 83605 ASSAY OF LACTIC ACID: CPT | Performed by: INTERNAL MEDICINE

## 2023-05-22 PROCEDURE — 85651 RBC SED RATE NONAUTOMATED: CPT | Performed by: INTERNAL MEDICINE

## 2023-05-22 PROCEDURE — 80074 ACUTE HEPATITIS PANEL: CPT | Performed by: INTERNAL MEDICINE

## 2023-05-22 PROCEDURE — 85025 COMPLETE CBC W/AUTO DIFF WBC: CPT | Performed by: STUDENT IN AN ORGANIZED HEALTH CARE EDUCATION/TRAINING PROGRAM

## 2023-05-22 PROCEDURE — 87070 CULTURE OTHR SPECIMN AEROBIC: CPT | Mod: 91 | Performed by: INTERNAL MEDICINE

## 2023-05-22 RX ORDER — HYDROMORPHONE HYDROCHLORIDE 2 MG/ML
1 INJECTION, SOLUTION INTRAMUSCULAR; INTRAVENOUS; SUBCUTANEOUS EVERY 4 HOURS PRN
Status: DISCONTINUED | OUTPATIENT
Start: 2023-05-22 | End: 2023-05-25 | Stop reason: HOSPADM

## 2023-05-22 RX ORDER — SODIUM CHLORIDE, SODIUM LACTATE, POTASSIUM CHLORIDE, CALCIUM CHLORIDE 600; 310; 30; 20 MG/100ML; MG/100ML; MG/100ML; MG/100ML
INJECTION, SOLUTION INTRAVENOUS CONTINUOUS
Status: DISCONTINUED | OUTPATIENT
Start: 2023-05-22 | End: 2023-05-23

## 2023-05-22 RX ORDER — HYDROCODONE BITARTRATE AND ACETAMINOPHEN 5; 325 MG/1; MG/1
1 TABLET ORAL EVERY 6 HOURS PRN
Status: DISCONTINUED | OUTPATIENT
Start: 2023-05-22 | End: 2023-05-22

## 2023-05-22 RX ORDER — HYDROCODONE BITARTRATE AND ACETAMINOPHEN 5; 325 MG/1; MG/1
1 TABLET ORAL EVERY 4 HOURS PRN
Status: DISCONTINUED | OUTPATIENT
Start: 2023-05-22 | End: 2023-05-25 | Stop reason: HOSPADM

## 2023-05-22 RX ORDER — ONDANSETRON 2 MG/ML
4 INJECTION INTRAMUSCULAR; INTRAVENOUS EVERY 4 HOURS PRN
Status: DISCONTINUED | OUTPATIENT
Start: 2023-05-22 | End: 2023-05-25 | Stop reason: HOSPADM

## 2023-05-22 RX ORDER — PROCHLORPERAZINE EDISYLATE 5 MG/ML
5 INJECTION INTRAMUSCULAR; INTRAVENOUS EVERY 6 HOURS PRN
Status: DISCONTINUED | OUTPATIENT
Start: 2023-05-22 | End: 2023-05-25 | Stop reason: HOSPADM

## 2023-05-22 RX ORDER — HYDROCODONE BITARTRATE AND ACETAMINOPHEN 5; 325 MG/1; MG/1
1 TABLET ORAL
Status: COMPLETED | OUTPATIENT
Start: 2023-05-22 | End: 2023-05-22

## 2023-05-22 RX ORDER — SODIUM CHLORIDE 0.9 % (FLUSH) 0.9 %
10 SYRINGE (ML) INJECTION
Status: DISCONTINUED | OUTPATIENT
Start: 2023-05-22 | End: 2023-05-25 | Stop reason: HOSPADM

## 2023-05-22 RX ORDER — HYDROGEN PEROXIDE 3 %
20 SOLUTION, NON-ORAL MISCELLANEOUS DAILY
COMMUNITY

## 2023-05-22 RX ORDER — POLYETHYLENE GLYCOL 3350 17 G/17G
17 POWDER, FOR SOLUTION ORAL 2 TIMES DAILY PRN
Status: DISCONTINUED | OUTPATIENT
Start: 2023-05-22 | End: 2023-05-25 | Stop reason: HOSPADM

## 2023-05-22 RX ORDER — ACETAMINOPHEN 325 MG/1
650 TABLET ORAL EVERY 4 HOURS PRN
Status: DISCONTINUED | OUTPATIENT
Start: 2023-05-22 | End: 2023-05-25 | Stop reason: HOSPADM

## 2023-05-22 RX ORDER — HYDRALAZINE HYDROCHLORIDE 20 MG/ML
10 INJECTION INTRAMUSCULAR; INTRAVENOUS EVERY 4 HOURS PRN
Status: DISCONTINUED | OUTPATIENT
Start: 2023-05-22 | End: 2023-05-25 | Stop reason: HOSPADM

## 2023-05-22 RX ORDER — KETOROLAC TROMETHAMINE 30 MG/ML
30 INJECTION, SOLUTION INTRAMUSCULAR; INTRAVENOUS EVERY 6 HOURS
Status: DISCONTINUED | OUTPATIENT
Start: 2023-05-22 | End: 2023-05-25 | Stop reason: HOSPADM

## 2023-05-22 RX ORDER — KETOROLAC TROMETHAMINE 30 MG/ML
30 INJECTION, SOLUTION INTRAMUSCULAR; INTRAVENOUS EVERY 6 HOURS PRN
Status: DISCONTINUED | OUTPATIENT
Start: 2023-05-22 | End: 2023-05-22

## 2023-05-22 RX ORDER — CLONIDINE HYDROCHLORIDE 0.2 MG/1
0.2 TABLET ORAL 3 TIMES DAILY PRN
Status: DISCONTINUED | OUTPATIENT
Start: 2023-05-22 | End: 2023-05-25 | Stop reason: HOSPADM

## 2023-05-22 RX ORDER — TALC
6 POWDER (GRAM) TOPICAL NIGHTLY PRN
Status: DISCONTINUED | OUTPATIENT
Start: 2023-05-22 | End: 2023-05-25 | Stop reason: HOSPADM

## 2023-05-22 RX ORDER — MAG HYDROX/ALUMINUM HYD/SIMETH 200-200-20
30 SUSPENSION, ORAL (FINAL DOSE FORM) ORAL 4 TIMES DAILY PRN
Status: DISCONTINUED | OUTPATIENT
Start: 2023-05-22 | End: 2023-05-25 | Stop reason: HOSPADM

## 2023-05-22 RX ORDER — ACETAMINOPHEN 500 MG
1000 TABLET ORAL EVERY 6 HOURS PRN
Status: DISCONTINUED | OUTPATIENT
Start: 2023-05-22 | End: 2023-05-25 | Stop reason: HOSPADM

## 2023-05-22 RX ORDER — ENOXAPARIN SODIUM 100 MG/ML
40 INJECTION SUBCUTANEOUS EVERY 24 HOURS
Status: DISCONTINUED | OUTPATIENT
Start: 2023-05-22 | End: 2023-05-25 | Stop reason: HOSPADM

## 2023-05-22 RX ORDER — LIDOCAINE HYDROCHLORIDE 10 MG/ML
5 INJECTION INFILTRATION; PERINEURAL
Status: COMPLETED | OUTPATIENT
Start: 2023-05-22 | End: 2023-05-22

## 2023-05-22 RX ORDER — AMOXICILLIN 250 MG
2 CAPSULE ORAL 2 TIMES DAILY PRN
Status: DISCONTINUED | OUTPATIENT
Start: 2023-05-22 | End: 2023-05-25 | Stop reason: HOSPADM

## 2023-05-22 RX ADMIN — SODIUM CHLORIDE, POTASSIUM CHLORIDE, SODIUM LACTATE AND CALCIUM CHLORIDE: 600; 310; 30; 20 INJECTION, SOLUTION INTRAVENOUS at 08:05

## 2023-05-22 RX ADMIN — PIPERACILLIN AND TAZOBACTAM 4.5 G: 4; .5 INJECTION, POWDER, LYOPHILIZED, FOR SOLUTION INTRAVENOUS; PARENTERAL at 02:05

## 2023-05-22 RX ADMIN — VANCOMYCIN HYDROCHLORIDE 2250 MG: 1.25 INJECTION, POWDER, LYOPHILIZED, FOR SOLUTION INTRAVENOUS at 03:05

## 2023-05-22 RX ADMIN — LIDOCAINE HYDROCHLORIDE 5 ML: 10 INJECTION, SOLUTION EPIDURAL; INFILTRATION; INTRACAUDAL; PERINEURAL at 11:05

## 2023-05-22 RX ADMIN — KETOROLAC TROMETHAMINE 30 MG: 30 INJECTION, SOLUTION INTRAMUSCULAR; INTRAVENOUS at 07:05

## 2023-05-22 RX ADMIN — ENOXAPARIN SODIUM 40 MG: 40 INJECTION SUBCUTANEOUS at 05:05

## 2023-05-22 RX ADMIN — VANCOMYCIN HYDROCHLORIDE 1750 MG: 10 INJECTION, POWDER, LYOPHILIZED, FOR SOLUTION INTRAVENOUS at 05:05

## 2023-05-22 RX ADMIN — HYDROMORPHONE HYDROCHLORIDE 1 MG: 2 INJECTION, SOLUTION INTRAMUSCULAR; INTRAVENOUS; SUBCUTANEOUS at 12:05

## 2023-05-22 RX ADMIN — SODIUM CHLORIDE, POTASSIUM CHLORIDE, SODIUM LACTATE AND CALCIUM CHLORIDE: 600; 310; 30; 20 INJECTION, SOLUTION INTRAVENOUS at 05:05

## 2023-05-22 RX ADMIN — CEFTRIAXONE SODIUM 2 G: 2 INJECTION, POWDER, FOR SOLUTION INTRAMUSCULAR; INTRAVENOUS at 06:05

## 2023-05-22 RX ADMIN — HYDROCODONE BITARTRATE AND ACETAMINOPHEN 1 TABLET: 5; 325 TABLET ORAL at 11:05

## 2023-05-22 RX ADMIN — HYDROCODONE BITARTRATE AND ACETAMINOPHEN 1 TABLET: 5; 325 TABLET ORAL at 02:05

## 2023-05-22 RX ADMIN — KETOROLAC TROMETHAMINE 30 MG: 30 INJECTION, SOLUTION INTRAMUSCULAR; INTRAVENOUS at 12:05

## 2023-05-22 NOTE — H&P
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  He was initially seen at [facility] on 2023 with redness swelling to the right index finger that started 2 day prior after working on a spare tire.  He had I&D performed in the emergency room and was discharged on clindamycin and tramadol.  He was again seen on 2023 with complaint of worsening redness and swelling, he was only given a dose of IV vancomycin and prescribed topical bacitracin subsequently discharged home.    Returned to [facility] ED on 2023 reporting no improvement and continue swelling of the right index finger with extension of erythema towards his dorsum of his hand.  He denies history of trauma or IV drug use.      On arrival to ED was afebrile hemodynamically stable.  Labs show no leukocytosis.  Right hand x-ray-radiology read pending-on my review there was no soft tissue gas or osseous changes.    He was given IV vancomycin Zosyn subsequently referred to hospital medicine service for further evaluation and management    ROS   Except as documented, all other systems reviewed and negative     Past Medical History   Denies any past medical history    Past Surgical History     Past Surgical History:   Procedure Laterality Date    LAPAROSCOPIC CHOLECYSTECTOMY N/A 2022    Procedure: CHOLECYSTECTOMY, LAPAROSCOPIC;  Surgeon: Jai Kitchen Jr., MD;  Location: [facility];  Service: General;  Laterality: N/A;       Social  History     Social History     Tobacco Use    Smoking status: Every Day     Types: Cigarettes    Smokeless tobacco: Never   Substance Use Topics    Alcohol use: Not on file        Family History   Reviewed and negative    Allergies   Patient has no known allergies.    Home Medications     Prior to Admission medications    Medication Sig Start Date End Date Taking? Authorizing Provider   bacitracin 500 unit/gram Oint Apply topically 2 (two) times daily. 5/19/23  Yes Cindy Grover MD   clindamycin (CLEOCIN) 150 MG capsule Take 2 capsules (300 mg total) by mouth 4 (four) times daily. for 7 days 5/18/23 5/25/23 Yes Aleah Carreon NP   silver sulfADIAZINE 1% (SILVADENE) 1 % cream Apply topically 2 (two) times daily. 5/3/23  Yes Jaleel Contreras MD   traMADoL (ULTRAM) 50 mg tablet Take 1 tablet (50 mg total) by mouth every 6 (six) hours as needed for Pain. 5/18/23 5/24/23 Yes Aleah Carreon NP   esomeprazole (NEXIUM) 20 MG capsule Take 20 mg by mouth once daily.    Historical Provider        Physical Exam   Vital Signs  Temp:  [97.5 °F (36.4 °C)]   Pulse:  [101]   Resp:  [16-20]   BP: (150)/(88)   SpO2:  [100 %]    General: Appears comfortable  HEENT: NC/AT  Neck:  No JVD  Chest: CTABL  CVS: Regular rhythm. Normal S1/S2.  Abdomen: nondistended, normoactive BS, soft and non-tender.  MSK: No obvious deformity or joint swelling  Skin:     Neuro: AAOx3, no focal neurological deficit  Psych: Cooperative    Labs     Recent Labs     05/22/23  0244   WBC 9.48   RBC 5.68   HGB 16.4   HCT 49.6   MCV 87.3   MCH 28.9   MCHC 33.1   RDW 12.3           Recent Labs     05/22/23  0202      K 4.4   CHLORIDE 100   CO2 26   BUN 11.9   CREATININE 0.83   EGFRNORACEVR >60   GLUCOSE 73*   CALCIUM 9.8   MG 2.20   ALBUMIN 3.8   GLOBULIN 3.8*   ALKPHOS 100   ALT 46   AST 31   BILITOT 0.8        Microbiology Results (last 7 days)       Procedure Component Value Units Date/Time    Blood culture #2 **CANNOT BE ORDERED STAT** [[id number]]  Collected: 05/22/23 0440    Order Status: Resulted Specimen: Blood Updated: 05/22/23 0440    Blood culture #1 **CANNOT BE ORDERED STAT** [[id number]] Collected: 05/22/23 0440    Order Status: Resulted Specimen: Blood Updated: 05/22/23 0440           Imaging     X-Ray Finger 2 or More Views Right    (Results Pending)     Assessment & Plan   Right index finger cellulitis and abscess  Chronic tobacco smoker    Plan:    Blood cultures x2 wound culture   Check CRP and ESR  IV vancomycin + ceftriaxone  Check HIV, hepatitis, and syphilis  If no improvement with IV antibiotic in the next 24-48 hour will consult hand surgery for possible need for debridement  PRN analgesics  VTE Prophylaxis:  Enoxaparin 40 mg subQ daily       Antonia Krishnamurthy MD  Internal Medicine

## 2023-05-22 NOTE — PROGRESS NOTES
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  He was initially seen at [facility] on 05/18/2023 with redness swelling to the right index finger that started 2 day prior after working on a spare tire.  He had I&D performed in the emergency room and was discharged on clindamycin and tramadol.  He was again seen on 05/19/2023 with complaint of worsening redness and swelling, he was only given a dose of IV vancomycin and prescribed topical bacitracin subsequently discharged home.     Returned to [facility] ED on 05/22/2023 reporting no improvement and continue swelling of the right index finger with extension of erythema towards his dorsum of his hand.  He denies history of trauma or IV drug use.       On arrival to ED was afebrile hemodynamically stable.  Labs show no leukocytosis.  Right hand x-ray-radiology read pending-on my review there was no soft tissue gas or osseous changes. He was given IV vancomycin Zosyn subsequently referred to hospital medicine service for further evaluation and management        Patient currently being managed for right index finger cellulitis and abscess currently on antibiotics     Today's information   Vitals reviewed and stable   Labs reviewed and stable   consult hand surgeon he will need I and D   Continue current antibiotics   Pain control     Exam  General: Appears comfortable  HEENT: NC/AT  Neck:  No JVD  Chest: CTABL  CVS: Regular rhythm. Normal S1/S2.  Abdomen: nondistended, normoactive BS, soft and non-tender.  MSK: No obvious deformity or joint swelling  Skin:     Neuro: AAOx3, no focal neurological deficit  Psych: Cooperative        Assessment & Plan   Right index finger cellulitis and  abscess  Elevated inflammatory markers   Chronic tobacco smoker     Plan:  Vitals reviewed and stable   Labs reviewed and stable   consult hand surgeon he will need I and D   Continue current antibiotics -IV vancomycin + ceftriaxone  F/up bld cx x2   Pain control   HIV, hepatitis, and syphilis negative       VTE Prophylaxis:  Enoxaparin 40 mg subQ daily       VITAL SIGNS: 24 HRS MIN & MAX LAST   Temp  Min: 97.5 °F (36.4 °C)  Max: 97.5 °F (36.4 °C) 97.5 °F (36.4 °C)   BP  Min: 124/80  Max: 150/88 124/80   Pulse  Min: 66  Max: 101  66   Resp  Min: 16  Max: 20 18   SpO2  Min: 95 %  Max: 100 % 95 %     I have reviewed the following labs:    Recent Labs   Lab 05/22/23  0244   WBC 9.48   RBC 5.68   HGB 16.4   HCT 49.6   MCV 87.3   MCH 28.9   MCHC 33.1   RDW 12.3      MPV 9.7       Recent Labs   Lab 05/22/23  0202      K 4.4   CO2 26   BUN 11.9   CREATININE 0.83   CALCIUM 9.8   MG 2.20   ALBUMIN 3.8   ALKPHOS 100   ALT 46   AST 31   BILITOT 0.8          Microbiology Results (last 7 days)       Procedure Component Value Units Date/Time    Wound Culture [[id number]] Collected: 05/22/23 1201    Order Status: Sent Specimen: Abscess from Finger Updated: 05/22/23 1206    Wound Culture [[id number]] Collected: 05/22/23 0517    Order Status: Sent Specimen: Drainage from Finger Updated: 05/22/23 0538    Blood culture #2 **CANNOT BE ORDERED STAT** [[id number]] Collected: 05/22/23 0440    Order Status: Resulted Specimen: Blood Updated: 05/22/23 0440    Blood culture #1 **CANNOT BE ORDERED STAT** [[id number]] Collected: 05/22/23 0440    Order Status: Resulted Specimen: Blood Updated: 05/22/23 0440             See below for Radiology    Scheduled Med:   cefTRIAXone (ROCEPHIN) IVPB  2 g Intravenous Q24H    enoxparin  40 mg Subcutaneous Daily    ketorolac  30 mg Intravenous Q6H    vancomycin (VANCOCIN) IVPB  1,750 mg Intravenous Q12H        Continuous Infusions:   lactated ringers 100 mL/hr at 05/22/23 0530        PRN  Meds:  acetaminophen, acetaminophen, aluminum-magnesium hydroxide-simethicone, cloNIDine, hydrALAZINE, HYDROcodone-acetaminophen, HYDROmorphone, melatonin, ondansetron, polyethylene glycol, prochlorperazine, senna-docusate 8.6-50 mg, sodium chloride 0.9%, Pharmacy to dose Vancomycin consult **AND** vancomycin - pharmacy to dose       Assessment/Plan:      VTE prophylaxis:     Patient condition:  Stable/Fair/Guarded/ Serious/ Critical    Anticipated discharge and Disposition:         All diagnosis and differential diagnosis have been reviewed; assessment and plan has been documented; I have personally reviewed the labs and test results that are presently available; I have reviewed the patients medication list; I have reviewed the consulting providers response and recommendations. I have reviewed or attempted to review medical records based upon their availability    All of the patient's questions have been  addressed and answered. Patient's is agreeable to the above stated plan. I will continue to monitor closely and make adjustments to medical management as needed.  _____________________________________________________________________    Nutrition Status:    Radiology:  I have personally reviewed the following imaging and agree with the radiologist.     CT Shoulder Without Contrast Right  Narrative: EXAMINATION:  CT SHOULDER WITHOUT CONTRAST RIGHT    CLINICAL HISTORY:  Shoulder pain, stress fracture suspected, neg xray;    TECHNIQUE:  Helically acquired imaging through the right shoulder were obtained without the IV administration of contrast. Axial, sagittal and coronal reformations were created and interpreted.    Automated tube current modulation, weight-based exposure dosing, and/or iterative reconstruction technique utilized to reach lowest reasonably achievable exposure rate.    DLP: 608 mGy*cm    COMPARISON:  Plain radiographs of the right shoulder 04/29/2023    FINDINGS:  BONES/JOINTS: No appreciable  fracture or dislocation.    SOFT TISSUES: Possible faint calcifications at the rotator cuff insertion.  Presumed reactive right axillary lymph nodes.    OTHER: N/A  Impression: 1. No appreciable fracture or dislocation  2. Possible faint calcifications at the rotator cuff insertion which may be related to calcific tendinitis.  3. If there is concern for internal derangement at the shoulder recommend outpatient MRI    Electronically signed by: Leyla Evangelista  Date:    05/03/2023  Time:    16:15      Paulino Rene MD   05/22/2023

## 2023-05-22 NOTE — CONSULTS
[facility] - Emergency Dept  Orthopedics  Consult Note    Patient Name: Samir Zazueta  MRN: [id number]  Admission Date: 5/22/2023  Hospital Length of Stay: 0 days  Attending Provider: Antonia Krishnamurthy MD  Primary Care Provider: Debby Elizondo MD    Patient information was obtained from patient and ER records.     Consults  Subjective:     Principal Problem:Cellulitis of index finger, right    Chief Complaint:   Chief Complaint   Patient presents with    Abscess     Patient complaint of worsening abscess to right 2nd finger.  This is his 3rd ER visit.  State pain getting worse.  Decreased ROM r/t swelling, redness.           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 He states that he noticed redness and swelling to the finger about 1 week ago.  He was initially seen at [facility] on 05/18/2023 with redness and swelling to the right index finger. He had I&D performed in the emergency room and was discharged on clindamycin PO.  He returned to [facility] ER the next day with complaint of worsening redness and swelling, and was given a dose of IV antibiotics and discharged home. He presents to [facility] ER today with complaints of continued redness, swelling and pain to the right index finger. He has not had any fever/chills. He states he has had some nausea. He was started on broad spectrum IV antibiotics and admitted to medicine's service. Ortho was consulted.     No past medical history on file.    Past Surgical History:   Procedure Laterality Date    LAPAROSCOPIC CHOLECYSTECTOMY N/A 7/31/2022    Procedure: CHOLECYSTECTOMY, LAPAROSCOPIC;  Surgeon: Jai Kitchen Jr., MD;  Location: [facility];  Service: General;  Laterality: N/A;       Review of patient's allergies indicates:  No Known Allergies    Current Facility-Administered Medications   Medication    acetaminophen  tablet 1,000 mg    acetaminophen tablet 650 mg    aluminum-magnesium hydroxide-simethicone 200-200-20 mg/5 mL suspension 30 mL    cefTRIAXone (ROCEPHIN) 2 g in dextrose 5 % in water (D5W) 5 % 50 mL IVPB (MB+)    cloNIDine tablet 0.2 mg    enoxaparin injection 40 mg    hydrALAZINE injection 10 mg    HYDROcodone-acetaminophen 5-325 mg per tablet 1 tablet    ketorolac injection 30 mg    lactated ringers infusion    LIDOcaine HCL 10 mg/ml (1%) injection 5 mL    melatonin tablet 6 mg    ondansetron injection 4 mg    polyethylene glycol packet 17 g    prochlorperazine injection Soln 5 mg    senna-docusate 8.6-50 mg per tablet 2 tablet    sodium chloride 0.9% flush 10 mL    vancomycin (VANCOCIN) 1,750 mg in dextrose 5 % (D5W) 500 mL IVPB    vancomycin - pharmacy to dose     Current Outpatient Medications   Medication Sig    bacitracin 500 unit/gram Oint Apply topically 2 (two) times daily.    clindamycin (CLEOCIN) 150 MG capsule Take 2 capsules (300 mg total) by mouth 4 (four) times daily. for 7 days    silver sulfADIAZINE 1% (SILVADENE) 1 % cream Apply topically 2 (two) times daily.    traMADoL (ULTRAM) 50 mg tablet Take 1 tablet (50 mg total) by mouth every 6 (six) hours as needed for Pain.    esomeprazole (NEXIUM) 20 MG capsule Take 20 mg by mouth once daily.     Family History    None       Tobacco Use    Smoking status: Every Day     Types: Cigarettes    Smokeless tobacco: Never   Substance and Sexual Activity    Alcohol use: Not on file    Drug use: Not on file    Sexual activity: Not on file     Review of Systems   Constitutional: Negative for chills and fever.   HENT:  Negative for congestion and hearing loss.    Eyes:  Negative for visual disturbance.   Cardiovascular:  Negative for chest pain and syncope.   Respiratory:  Negative for cough and shortness of breath.    Hematologic/Lymphatic: Does not bruise/bleed easily.   Skin:  Negative for color change and rash.   Gastrointestinal:  Positive for nausea. Negative  "for abdominal pain and vomiting.   Genitourinary:  Negative for dysuria and hematuria.   Neurological:  Negative for numbness, sensory change and weakness.   Psychiatric/Behavioral:  Negative for altered mental status.    Objective:     Vital Signs (Most Recent):  Temp: 97.5 °F (36.4 °C) (05/22/23 0119)  Pulse: 66 (05/22/23 1051)  Resp: 18 (05/22/23 1121)  BP: 124/80 (05/22/23 1051)  SpO2: 95 % (05/22/23 1051) Vital Signs (24h Range):  Temp:  [97.5 °F (36.4 °C)] 97.5 °F (36.4 °C)  Pulse:  [] 66  Resp:  [16-20] 18  SpO2:  [95 %-100 %] 95 %  BP: (124-150)/(80-88) 124/80     Weight: 100 kg (220 lb 7.4 oz)  Height: 6' 0.05" (183 cm)  Body mass index is 29.86 kg/m².    No intake or output data in the 24 hours ending 05/22/23 1224    General    Constitutional: He is oriented to person, place, and time. He appears well-developed and well-nourished. No distress.   HENT:   Head: Normocephalic and atraumatic.   Eyes: EOM are normal.   Neck: Neck supple.   Cardiovascular:  Normal rate and intact distal pulses.            Pulmonary/Chest: Effort normal. No respiratory distress.   Abdominal: Soft. He exhibits no distension. There is no abdominal tenderness.   Neurological: He is alert and oriented to person, place, and time.   Psychiatric: He has a normal mood and affect. His behavior is normal. Judgment and thought content normal.         Right index finger: he has erythema and swelling with abscess over the radial aspect of the proximal phalanx. There are no open wounds and no active drainage. The area is tender with palpation. He has limitations with range of motion due to swelling. He does not have significant joint pain with ROM at the MCP or PIP joint. No streaking. Brisk capillary refill distally.     Significant Labs: BMP:   Recent Labs   Lab 05/22/23  0202      K 4.4   CO2 26   BUN 11.9   CREATININE 0.83   CALCIUM 9.8   MG 2.20     CBC:   Recent Labs   Lab 05/22/23  0244   WBC 9.48   HGB 16.4   HCT 49.6 "        All pertinent labs within the past 24 hours have been reviewed.    Significant Imaging: X-Ray: I have reviewed all pertinent results/findings and my personal findings are:  xray right index finger with no acute fracture, dislocation, or foreign body    Assessment/Plan:     Active Diagnoses:    Diagnosis Date Noted POA    PRINCIPAL PROBLEM:  Cellulitis of index finger, right [L03.011] 05/22/2023 Yes      Problems Resolved During this Admission:     Patient has cellulitis/abscess to the right index finger. He does not have any evidence of septic arthritis or flexor tenosynovitis. He is admitted to medicine's service and has been started on broad spectrum antibiotics. Incision and drainage was performed today. Recommend maximal elevation for swelling. Culture was sent to lab to guide antibiotic therapy. We will continue to monitor him throughout his patient stay.     The above findings, diagnosis, and treatment plan were discussed with Dr. Sher Lopez who is in agreement.      Thank you for your consult.       MAKRIE Cardona  Orthopedics  [facility] - Emergency Dept

## 2023-05-22 NOTE — Clinical Note
Diagnosis: Cellulitis [002618]   Future Attending Provider: LOS SOTO [[id number]]   Admitting Provider:: LOS SOTO [[id number]]

## 2023-05-22 NOTE — PROCEDURES
"Samir Zazueta is a 32 y.o. male patient.    Temp: 97.5 °F (36.4 °C) (05/22/23 0119)  Pulse: 66 (05/22/23 1051)  Resp: 18 (05/22/23 1121)  BP: 124/80 (05/22/23 1051)  SpO2: 95 % (05/22/23 1051)  Weight: 100 kg (220 lb 7.4 oz) (05/22/23 0119)  Height: 6' 0.05" (183 cm) (05/22/23 0119)       Incision and Drainage    Date/Time: 5/22/2023 12:33 PM  Location procedure was performed: [facility] EMERGENCY DEPARTMENT  Performed by: MARKIE Cardona  Authorized by: MARKIE Cardona   Pre-operative diagnosis: right index finger abscess  Post-operative diagnosis: right index finger abscess  Consent Done: Yes  Consent: Verbal consent obtained. Written consent obtained.  Risks and benefits: risks, benefits and alternatives were discussed  Consent given by: patient  Patient understanding: patient states understanding of the procedure being performed  Patient consent: the patient's understanding of the procedure matches consent given  Patient identity confirmed: name and MRN  Type: abscess  Body area: upper extremity  Location details: right index finger  Anesthesia: local infiltration    Anesthesia:  Local Anesthetic: lidocaine 1% without epinephrine  Anesthetic total: 5 mL    Patient sedated: no  Scalpel size: 11  Incision type: single straight  Complexity: simple  Drainage: pus, bloody and purulent  Drainage amount: moderate  Wound treatment: incision, drainage and wound packed  Packing material: 1/4 in iodoform gauze  Technical procedures used: right hand prepped with betadine. 2 cm incision made over radial aspect of index finger over the proximal phalanx. purosanguinous drainage expressed. Deep wound culture swab taken and sent to lab. Wound thoroughy irrigated with normal saline then paced with 1x4 inch iodoform gauze. Sterile dressing appled.  Complications: No  Specimens: Yes (wound culture)  Implants: No  Patient tolerance: Patient tolerated the procedure well with no immediate complications        5/22/2023    "

## 2023-05-22 NOTE — ED PROVIDER NOTES
Encounter Date: 5/22/2023    SCRIBE #1 NOTE: I, Satnam Jones, am scribing for, and in the presence of,  Alhaji Coelho MD. I have scribed the following portions of the note - Other sections scribed: HPI, ROS, PE.     History     Chief Complaint   Patient presents with    Abscess     Patient complaint of worsening abscess to right 2nd finger.  This is his 3rd ER visit.  State pain getting worse.  Decreased ROM r/t swelling, redness.        32 year old male presents to ED for pain to his right index finger onset 1 week ago. Pt states that he cut his finger on the inside of a car wheel while changing a tire. Pt states that the redness and pain have worsened after 2 visits to [facility] ED and 5 days of abx course.  Reports he was placed on clindamycin and has been taking it.  He presented again and was placed on bacitracin.  Pt reports that he is right handed. Pt reports negative medical history.    The history is provided by the patient. No  was used.   Abscess   The abscess is present on the right fingers. Pertinent negatives include no fever and no sore throat.       Review of patient's allergies indicates:  No Known Allergies  No past medical history on file.  Past Surgical History:   Procedure Laterality Date    LAPAROSCOPIC CHOLECYSTECTOMY N/A 7/31/2022    Procedure: CHOLECYSTECTOMY, LAPAROSCOPIC;  Surgeon: Jai Kitchen Jr., MD;  Location: [facility];  Service: General;  Laterality: N/A;     No family history on file.  Social History     Tobacco Use    Smoking status: Every Day     Types: Cigarettes    Smokeless tobacco: Never     Review of Systems   Constitutional:  Negative for fever.   HENT:  Negative for sore throat.    Eyes:  Negative for visual disturbance.   Respiratory:  Negative for shortness of breath.    Cardiovascular:  Negative for chest pain.   Gastrointestinal:  Negative for abdominal pain.   Genitourinary:  Negative for dysuria.   Musculoskeletal:  Negative for  joint swelling.        Right index finger pain   Skin:  Negative for rash.   Neurological:  Negative for weakness.   Psychiatric/Behavioral:  Negative for confusion.    All other systems reviewed and are negative.    Physical Exam     Initial Vitals [05/22/23 0119]   BP Pulse Resp Temp SpO2   (!) 150/88 101 20 97.5 °F (36.4 °C) 100 %      MAP       --             Physical Exam    Nursing note and vitals reviewed.  Constitutional: He appears well-developed and well-nourished. He is not diaphoretic. No distress.   HENT:   Head: Normocephalic and atraumatic.   Eyes: Conjunctivae and EOM are normal. Pupils are equal, round, and reactive to light.   Neck:   Normal range of motion.  Cardiovascular:  Normal rate, regular rhythm, normal heart sounds and intact distal pulses.           No murmur heard.  Pulmonary/Chest: Breath sounds normal. No respiratory distress. He has no wheezes. He has no rales.   Abdominal: Abdomen is soft. He exhibits no distension. There is no abdominal tenderness.   Musculoskeletal:         General: No tenderness or edema. Normal range of motion.      Cervical back: Normal range of motion.      Comments: Erythema and edema of the lateral and posterior aspect of the right 2nd digit.  Previous I&D incision site noted.  No flexor surface erythema or induration noted.  Flexion intact.     Neurological: He is alert and oriented to person, place, and time. No cranial nerve deficit.   Skin: Skin is warm and dry. Capillary refill takes less than 2 seconds. No rash noted. There is erythema.   Erythema to right index finger dorsal surface with opening form previous I&D   Psychiatric: He has a normal mood and affect.       ED Course   Procedures  Labs Reviewed   COMPREHENSIVE METABOLIC PANEL - Abnormal; Notable for the following components:       Result Value    Glucose Level 73 (*)     Globulin 3.8 (*)     Albumin/Globulin Ratio 1.0 (*)     All other components within normal limits   CBC WITH DIFFERENTIAL -  Abnormal; Notable for the following components:    IG# 0.05 (*)     All other components within normal limits   C-REACTIVE PROTEIN - Abnormal; Notable for the following components:    C-Reactive Protein 26.40 (*)     All other components within normal limits   SEDIMENTATION RATE - Abnormal; Notable for the following components:    Sed Rate 27 (*)     All other components within normal limits   CBC WITHOUT DIFFERENTIAL - Abnormal; Notable for the following components:    MCHC 32.9 (*)     All other components within normal limits   MAGNESIUM - Normal   HEPATITIS PANEL, ACUTE - Normal   HIV 1 / 2 ANTIBODY - Normal   SYPHILIS ANTIBODY (WITH REFLEX RPR) - Normal   LACTIC ACID, PLASMA - Normal   CBC W/ AUTO DIFFERENTIAL    Narrative:     The following orders were created for panel order CBC auto differential.  Procedure                               Abnormality         Status                     ---------                               -----------         ------                     CBC with Differential[[id number]]        Abnormal            Final result                 Please view results for these tests on the individual orders.   HEMOGLOBIN A1C   PATHOLOGIST INTERPRETATION   BASIC METABOLIC PANEL          Imaging Results              X-Ray Finger 2 or More Views Right (Final result)  Result time 05/22/23 15:06:26      Final result by Marbin Jimenes MD (05/22/23 15:06:26)                   Impression:      Significant soft tissue swelling of the index finger.      Electronically signed by: Marbin Jimenes MD  Date:    05/22/2023  Time:    15:06               Narrative:    EXAMINATION:  XR FINGER 2 OR MORE VIEWS RIGHT    CLINICAL HISTORY:  figner;    COMPARISON:  None    FINDINGS:  There is significant soft tissue swelling of the index finger.  A fracture is not seen.  No foreign bodies are noted.                                    X-Rays:   Independently Interpreted Readings:   Other Readings:  X-ray: No foreign body  visualized  Medications   piperacillin-tazobactam (ZOSYN) 4.5 g in dextrose 5 % in water (D5W) 5 % 100 mL IVPB (MB+) (0 g Intravenous Stopped 5/22/23 0340)   HYDROcodone-acetaminophen 5-325 mg per tablet 1 tablet (1 tablet Oral Given 5/22/23 0245)   vancomycin (VANCOCIN) 2,250 mg in dextrose 5 % (D5W) 500 mL IVPB (0 mg Intravenous Stopped 5/22/23 0611)   LIDOcaine HCL 10 mg/ml (1%) injection 5 mL (5 mLs Infiltration Given 5/22/23 1115)     Medical Decision Making:   History:   Old Medical Records: I decided to obtain old medical records.  Old Records Summarized: records from clinic visits and records from previous admission(s).       <> Summary of Records: Reviewed old records.  Initial Assessment:   Cellulitis, abscess  Differential Diagnosis:   Cellulitis, abscess, flexor tenosynovitis,  Clinical Tests:   Lab Tests: Ordered and Reviewed  Radiological Study: Ordered and Reviewed  ED Management:  Patient is a 32-year-old male presents to emergency department for redness erythema of the 2nd digit on right hand.  See HPI.  See physical exam.  Patient has updated tetanus.  He had recent I&D performed.  Was prescribed cleaned.  Will start broad-spectrum antibiotics due to failure of outpatient therapy.  Low suspicion for any sepsis or flexor tenosynovitis at this time.  Discussed with medicine for admission.  All results discussed answered all questions time.  Patient verbalized understanding agreed to plan.        Scribe Attestation:   Scribe #1: I performed the above scribed service and the documentation accurately describes the services I performed. I attest to the accuracy of the note.    Attending Attestation:           Physician Attestation for Scribe:  Physician Attestation Statement for Scribe #1: I, Keven Benitez MD, reviewed documentation, as scribed by Satnam Jones in my presence, and it is both accurate and complete.       Medical Decision Making  Problems Addressed:  Cellulitis: acute illness or  injury that poses a threat to life or bodily functions    Amount and/or Complexity of Data Reviewed  External Data Reviewed: notes.  Labs: ordered.  Radiology: ordered and independent interpretation performed.           ED Course as of 05/27/23 9181   Mon May 22, 2023   [facility] [facility] []      ED Course User Index  [BG] Hay Robert                 Clinical Impression:   Final diagnoses:  [L03.90] Cellulitis  [L03.011] Cellulitis of finger of right hand (Primary)        ED Disposition Condition    Observation Stable                Keven Benitez MD  05/27/23 6147

## 2023-05-22 NOTE — PROGRESS NOTES
Pharmacokinetic Initial Assessment: IV Vancomycin    Assessment/Plan:    Initiate intravenous vancomycin with loading dose of 2250 mg once followed by a maintenance dose of vancomycin 1750 mg IV every 12 hours  Desired empiric serum trough concentration is 15 to 20 mcg/mL  Draw vancomycin trough level 60 min prior to fourth dose on 05/23 at approximately 1600  Pharmacy will continue to follow and monitor vancomycin.      Please contact pharmacy at extension [phone] with any questions regarding this assessment.     Thank you for the consult,   Jean Carlos Alegria       Patient brief summary:  Samir Zazueta is a 32 y.o. male initiated on antimicrobial therapy with IV Vancomycin for treatment of suspected skin & soft tissue infection    Drug Allergies:   Review of patient's allergies indicates:  No Known Allergies    Actual Body Weight:   100kg    Renal Function:   Estimated Creatinine Clearance: 158 mL/min (based on SCr of 0.83 mg/dL).,     Dialysis Method (if applicable):  N/A    CBC (last 72 hours):  Recent Labs   Lab Result Units 05/22/23  0244   WBC x10(3)/mcL 9.48   Hgb g/dL 16.4   Hct % 49.6   Platelet x10(3)/mcL 291   Mono % % 12.2   Eos % % 0.6   Basophil % % 0.4       Metabolic Panel (last 72 hours):  Recent Labs   Lab Result Units 05/22/23  0202   Sodium Level mmol/L 136   Potassium Level mmol/L 4.4   Chloride mmol/L 100   Carbon Dioxide mmol/L 26   Glucose Level mg/dL 73*   Blood Urea Nitrogen mg/dL 11.9   Creatinine mg/dL 0.83   Albumin Level g/dL 3.8   Bilirubin Total mg/dL 0.8   Alkaline Phosphatase unit/L 100   Aspartate Aminotransferase unit/L 31   Alanine Aminotransferase unit/L 46   Magnesium Level mg/dL 2.20       Drug levels (last 3 results):  No results for input(s): VANCOMYCINRA, VANCORANDOM, VANCOMYCINPE, VANCOPEAK, VANCOMYCINTR, VANCOTROUGH in the last 72 hours.    Microbiologic Results:  Microbiology Results (last 7 days)       Procedure Component Value Units Date/Time    Wound Culture [[id number]]  Collected: 05/22/23 0517    Order Status: Sent Specimen: Drainage from Finger     Blood culture #2 **CANNOT BE ORDERED STAT** [[id number]] Collected: 05/22/23 0440    Order Status: Resulted Specimen: Blood Updated: 05/22/23 0440    Blood culture #1 **CANNOT BE ORDERED STAT** [[id number]] Collected: 05/22/23 0440    Order Status: Resulted Specimen: Blood Updated: 05/22/23 0440

## 2023-05-23 LAB
ANION GAP SERPL CALC-SCNC: 10 MEQ/L
BUN SERPL-MCNC: 11.3 MG/DL (ref 8.9–20.6)
CALCIUM SERPL-MCNC: 9.6 MG/DL (ref 8.4–10.2)
CHLORIDE SERPL-SCNC: 100 MMOL/L (ref 98–107)
CO2 SERPL-SCNC: 27 MMOL/L (ref 22–29)
CREAT SERPL-MCNC: 0.77 MG/DL (ref 0.73–1.18)
CREAT/UREA NIT SERPL: 15
ERYTHROCYTE [DISTWIDTH] IN BLOOD BY AUTOMATED COUNT: 12.2 % (ref 11.5–17)
GFR SERPLBLD CREATININE-BSD FMLA CKD-EPI: >60 MLS/MIN/1.73/M2
GLUCOSE SERPL-MCNC: 89 MG/DL (ref 74–100)
HCT VFR BLD AUTO: 50.4 % (ref 42–52)
HGB BLD-MCNC: 16.6 G/DL (ref 14–18)
MCH RBC QN AUTO: 28.8 PG (ref 27–31)
MCHC RBC AUTO-ENTMCNC: 32.9 G/DL (ref 33–36)
MCV RBC AUTO: 87.3 FL (ref 80–94)
NRBC BLD AUTO-RTO: 0 %
PATH REV: NORMAL
PLATELET # BLD AUTO: 249 X10(3)/MCL (ref 130–400)
PMV BLD AUTO: 9.9 FL (ref 7.4–10.4)
POTASSIUM SERPL-SCNC: 4.8 MMOL/L (ref 3.5–5.1)
RBC # BLD AUTO: 5.77 X10(6)/MCL (ref 4.7–6.1)
SODIUM SERPL-SCNC: 137 MMOL/L (ref 136–145)
VANCOMYCIN TROUGH SERPL-MCNC: 11.9 UG/ML (ref 15–20)
WBC # SPEC AUTO: 6.79 X10(3)/MCL (ref 4.5–11.5)

## 2023-05-23 PROCEDURE — 80048 BASIC METABOLIC PNL TOTAL CA: CPT | Performed by: INTERNAL MEDICINE

## 2023-05-23 PROCEDURE — 63600175 PHARM REV CODE 636 W HCPCS: Performed by: INTERNAL MEDICINE

## 2023-05-23 PROCEDURE — 85027 COMPLETE CBC AUTOMATED: CPT | Performed by: INTERNAL MEDICINE

## 2023-05-23 PROCEDURE — 25000003 PHARM REV CODE 250: Performed by: INTERNAL MEDICINE

## 2023-05-23 PROCEDURE — G0378 HOSPITAL OBSERVATION PER HR: HCPCS

## 2023-05-23 PROCEDURE — 21400001 HC TELEMETRY ROOM

## 2023-05-23 PROCEDURE — 96372 THER/PROPH/DIAG INJ SC/IM: CPT | Performed by: INTERNAL MEDICINE

## 2023-05-23 PROCEDURE — 80202 ASSAY OF VANCOMYCIN: CPT | Performed by: INTERNAL MEDICINE

## 2023-05-23 RX ORDER — MUPIROCIN 20 MG/G
OINTMENT TOPICAL DAILY
Status: DISCONTINUED | OUTPATIENT
Start: 2023-05-24 | End: 2023-05-25 | Stop reason: HOSPADM

## 2023-05-23 RX ADMIN — HYDROCODONE BITARTRATE AND ACETAMINOPHEN 1 TABLET: 5; 325 TABLET ORAL at 05:05

## 2023-05-23 RX ADMIN — KETOROLAC TROMETHAMINE 30 MG: 30 INJECTION, SOLUTION INTRAMUSCULAR; INTRAVENOUS at 07:05

## 2023-05-23 RX ADMIN — VANCOMYCIN HYDROCHLORIDE 2000 MG: 10 INJECTION, POWDER, LYOPHILIZED, FOR SOLUTION INTRAVENOUS at 07:05

## 2023-05-23 RX ADMIN — HYDROCODONE BITARTRATE AND ACETAMINOPHEN 1 TABLET: 5; 325 TABLET ORAL at 12:05

## 2023-05-23 RX ADMIN — HYDROCODONE BITARTRATE AND ACETAMINOPHEN 1 TABLET: 5; 325 TABLET ORAL at 09:05

## 2023-05-23 RX ADMIN — CEFTRIAXONE SODIUM 2 G: 2 INJECTION, POWDER, FOR SOLUTION INTRAMUSCULAR; INTRAVENOUS at 05:05

## 2023-05-23 RX ADMIN — KETOROLAC TROMETHAMINE 30 MG: 30 INJECTION, SOLUTION INTRAMUSCULAR; INTRAVENOUS at 06:05

## 2023-05-23 RX ADMIN — KETOROLAC TROMETHAMINE 30 MG: 30 INJECTION, SOLUTION INTRAMUSCULAR; INTRAVENOUS at 12:05

## 2023-05-23 RX ADMIN — ENOXAPARIN SODIUM 40 MG: 40 INJECTION SUBCUTANEOUS at 05:05

## 2023-05-23 RX ADMIN — VANCOMYCIN HYDROCHLORIDE 1750 MG: 10 INJECTION, POWDER, LYOPHILIZED, FOR SOLUTION INTRAVENOUS at 06:05

## 2023-05-23 NOTE — PROGRESS NOTES
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  He was initially seen at [facility] on 05/18/2023 with redness swelling to the right index finger that started 2 day prior after working on a spare tire.  He had I&D performed in the emergency room and was discharged on clindamycin and tramadol.  He was again seen on 05/19/2023 with complaint of worsening redness and swelling, he was only given a dose of IV vancomycin and prescribed topical bacitracin subsequently discharged home.     Returned to [facility] ED on 05/22/2023 reporting no improvement and continue swelling of the right index finger with extension of erythema towards his dorsum of his hand.  He denies history of trauma or IV drug use.       On arrival to ED was afebrile hemodynamically stable.  Labs show no leukocytosis.  Right hand x-ray-radiology read pending-on my review there was no soft tissue gas or osseous changes. He was given IV vancomycin Zosyn subsequently referred to hospital medicine service for further evaluation and management        Patient currently being managed for right index finger cellulitis and abscess currently on antibiotics .  Patient was status post I&D on 05/22 by Orthopedic surgery.  Follow-up fluid cultures, continue IV vancomycin and ceftriaxone, pain meds as needed, consult wound care nurse for dressing    Today's information   Patient in between switching rooms  Vitals reviewed and stable   Labs reviewed and stable   Continue current antibiotics   Pain control              Assessment & Plan   Right index finger cellulitis and abscess  Elevated inflammatory markers   Chronic tobacco smoker     Plan:  Patient is status post I&D on 05/22 by  Orthopedic surgery.    Follow-up fluid cultures,   continue IV vancomycin and ceftriaxone,  F/up bld cx x2   Pain control   HIV, hepatitis, and syphilis negative   consult wound care nurse for dressing      VTE Prophylaxis:  Enoxaparin 40 mg subQ daily      VITAL SIGNS: 24 HRS MIN & MAX LAST   Temp  Min: 98 °F (36.7 °C)  Max: 98.2 °F (36.8 °C) 98.2 °F (36.8 °C)   BP  Min: 126/84  Max: 146/85 (!) 140/86   Pulse  Min: 69  Max: 84  72   Resp  Min: 16  Max: 20 20   SpO2  Min: 94 %  Max: 100 % 99 %     I have reviewed the following labs:    Recent Labs   Lab 05/22/23  0244 05/23/23  0330   WBC 9.48 6.79   RBC 5.68 5.77   HGB 16.4 16.6   HCT 49.6 50.4   MCV 87.3 87.3   MCH 28.9 28.8   MCHC 33.1 32.9*   RDW 12.3 12.2    249   MPV 9.7 9.9       Recent Labs   Lab 05/22/23  0202 05/23/23  0330    137   K 4.4 4.8   CO2 26 27   BUN 11.9 11.3   CREATININE 0.83 0.77   CALCIUM 9.8 9.6   MG 2.20  --    ALBUMIN 3.8  --    ALKPHOS 100  --    ALT 46  --    AST 31  --    BILITOT 0.8  --           Microbiology Results (last 7 days)       Procedure Component Value Units Date/Time    Wound Culture [[id number]]  (Abnormal) Collected: 05/22/23 0517    Order Status: Completed Specimen: Drainage from Finger Updated: 05/23/23 0855     Wound Culture Moderate Staphylococcus aureus    Wound Culture [[id number]]  (Abnormal) Collected: 05/22/23 1201    Order Status: Completed Specimen: Abscess from Finger Updated: 05/23/23 0854     Wound Culture Many Staphylococcus aureus    Blood culture #2 **CANNOT BE ORDERED STAT** [[id number]]  (Normal) Collected: 05/22/23 0440    Order Status: Completed Specimen: Blood Updated: 05/23/23 0600     CULTURE, BLOOD ([facility]) No Growth At 24 Hours    Blood culture #1 **CANNOT BE ORDERED STAT** [[id number]]  (Normal) Collected: 05/22/23 0440    Order Status: Completed Specimen: Blood Updated: 05/23/23 0600     CULTURE, BLOOD ([facility]) No Growth At 24 Hours             See below for Radiology    Scheduled Med:    cefTRIAXone (ROCEPHIN) IVPB  2 g Intravenous Q24H    enoxparin  40 mg Subcutaneous Daily    ketorolac  30 mg Intravenous Q6H    vancomycin (VANCOCIN) IVPB  1,750 mg Intravenous Q12H        Continuous Infusions:       PRN Meds:  acetaminophen, acetaminophen, aluminum-magnesium hydroxide-simethicone, cloNIDine, hydrALAZINE, HYDROcodone-acetaminophen, HYDROmorphone, melatonin, ondansetron, polyethylene glycol, prochlorperazine, senna-docusate 8.6-50 mg, sodium chloride 0.9%, Pharmacy to dose Vancomycin consult **AND** vancomycin - pharmacy to dose       Assessment/Plan:      VTE prophylaxis:     Patient condition:  Stable/Fair/Guarded/ Serious/ Critical    Anticipated discharge and Disposition:         All diagnosis and differential diagnosis have been reviewed; assessment and plan has been documented; I have personally reviewed the labs and test results that are presently available; I have reviewed the patients medication list; I have reviewed the consulting providers response and recommendations. I have reviewed or attempted to review medical records based upon their availability    All of the patient's questions have been  addressed and answered. Patient's is agreeable to the above stated plan. I will continue to monitor closely and make adjustments to medical management as needed.  _____________________________________________________________________    Nutrition Status:    Radiology:  I have personally reviewed the following imaging and agree with the radiologist.     X-Ray Finger 2 or More Views Right  Narrative: EXAMINATION:  XR FINGER 2 OR MORE VIEWS RIGHT    CLINICAL HISTORY:  figner;    COMPARISON:  None    FINDINGS:  There is significant soft tissue swelling of the index finger.  A fracture is not seen.  No foreign bodies are noted.  Impression: Significant soft tissue swelling of the index finger.    Electronically signed by: Marbin Jimenes MD  Date:    05/22/2023  Time:    15:06      Paulino Rene MD    05/23/2023

## 2023-05-23 NOTE — PROGRESS NOTES
Pharmacokinetic Assessment Follow Up: IV Vancomycin    Vancomycin serum concentration assessment(s):    The trough level was drawn correctly and can be used to guide therapy at this time. The measurement is below the desired definitive target range of 15 to 20 mcg/mL.    Vancomycin Regimen Plan:    Change to vancomycin 2 grams q12h and check trough on 5/25/23 at 0500.    Drug levels (last 3 results):  Recent Labs   Lab Result Units 05/23/23  1557   Vancomycin Trough ug/ml 11.9*       Pharmacy will continue to follow and monitor vancomycin.    Please contact pharmacy at extension [phone] for questions regarding this assessment.    Thank you for the consult,   Maria L Vail       Patient brief summary:  Samir Zazueta is a 32 y.o. male initiated on antimicrobial therapy with IV Vancomycin for treatment of skin & soft tissue infection    Drug Allergies:   Review of patient's allergies indicates:  No Known Allergies    Actual Body Weight:   100 kg    Renal Function:   Estimated Creatinine Clearance: 170.3 mL/min (based on SCr of 0.77 mg/dL).,     Dialysis Method (if applicable):  N/A    CBC (last 72 hours):  Recent Labs   Lab Result Units 05/22/23  0244 05/22/23  0528 05/23/23  0330   WBC x10(3)/mcL 9.48  --  6.79   Hgb g/dL 16.4  --  16.6   Hemoglobin A1c %  --  5.0  --    Hct % 49.6  --  50.4   Platelet x10(3)/mcL 291  --  249   Mono % % 12.2  --   --    Eos % % 0.6  --   --    Basophil % % 0.4  --   --        Metabolic Panel (last 72 hours):  Recent Labs   Lab Result Units 05/22/23  0202 05/23/23  0330   Sodium Level mmol/L 136 137   Potassium Level mmol/L 4.4 4.8   Chloride mmol/L 100 100   Carbon Dioxide mmol/L 26 27   Glucose Level mg/dL 73* 89   Blood Urea Nitrogen mg/dL 11.9 11.3   Creatinine mg/dL 0.83 0.77   Albumin Level g/dL 3.8  --    Bilirubin Total mg/dL 0.8  --    Alkaline Phosphatase unit/L 100  --    Aspartate Aminotransferase unit/L 31  --    Alanine Aminotransferase unit/L 46  --    Magnesium Level  mg/dL 2.20  --        Vancomycin Administrations:  vancomycin given in the last 96 hours                     vancomycin (VANCOCIN) 1,750 mg in dextrose 5 % (D5W) 500 mL IVPB (mg) 1,750 mg New Bag 05/23/23 0600     1,750 mg New Bag 05/22/23 1750    vancomycin (VANCOCIN) 2,250 mg in dextrose 5 % (D5W) 500 mL IVPB (mg) 2,250 mg New Bag 05/22/23 0338                    Microbiologic Results:  Microbiology Results (last 7 days)       Procedure Component Value Units Date/Time    Wound Culture [[id number]]  (Abnormal) Collected: 05/22/23 0517    Order Status: Completed Specimen: Drainage from Finger Updated: 05/23/23 0855     Wound Culture Moderate Staphylococcus aureus    Wound Culture [[id number]]  (Abnormal) Collected: 05/22/23 1201    Order Status: Completed Specimen: Abscess from Finger Updated: 05/23/23 0854     Wound Culture Many Staphylococcus aureus    Blood culture #2 **CANNOT BE ORDERED STAT** [[id number]]  (Normal) Collected: 05/22/23 0440    Order Status: Completed Specimen: Blood Updated: 05/23/23 0600     CULTURE, BLOOD ([facility]) No Growth At 24 Hours    Blood culture #1 **CANNOT BE ORDERED STAT** [[id number]]  (Normal) Collected: 05/22/23 0440    Order Status: Completed Specimen: Blood Updated: 05/23/23 0600     CULTURE, BLOOD ([facility]) No Growth At 24 Hours

## 2023-05-23 NOTE — NURSING
Nurses Note -- 4 Eyes      5/23/2023   3:26 PM      Skin assessed during: Admit      [] No Altered Skin Integrity Present    []Prevention Measures Documented      [x] Yes- Altered Skin Integrity Present or Discovered   [x] LDA Added if Not in Epic (Describe Wound)   [] New Altered Skin Integrity was Present on Admit and Documented in LDA   [x] Wound Image Taken    Wound Care Consulted? Yes    Attending Nurse:  Homero Hinkle RN     Second RN/Staff Member:  Lashon Mora RN

## 2023-05-24 LAB
BACTERIA SPEC CULT: ABNORMAL
BACTERIA SPEC CULT: ABNORMAL

## 2023-05-24 PROCEDURE — 21400001 HC TELEMETRY ROOM

## 2023-05-24 PROCEDURE — 25000003 PHARM REV CODE 250: Performed by: INTERNAL MEDICINE

## 2023-05-24 PROCEDURE — 63600175 PHARM REV CODE 636 W HCPCS: Performed by: INTERNAL MEDICINE

## 2023-05-24 PROCEDURE — 94761 N-INVAS EAR/PLS OXIMETRY MLT: CPT

## 2023-05-24 RX ORDER — ENOXAPARIN SODIUM 100 MG/ML
40 INJECTION SUBCUTANEOUS EVERY 24 HOURS
Status: DISCONTINUED | OUTPATIENT
Start: 2023-05-24 | End: 2023-05-24

## 2023-05-24 RX ADMIN — VANCOMYCIN HYDROCHLORIDE 2000 MG: 10 INJECTION, POWDER, LYOPHILIZED, FOR SOLUTION INTRAVENOUS at 08:05

## 2023-05-24 RX ADMIN — KETOROLAC TROMETHAMINE 30 MG: 30 INJECTION, SOLUTION INTRAMUSCULAR; INTRAVENOUS at 02:05

## 2023-05-24 RX ADMIN — MUPIROCIN: 20 OINTMENT TOPICAL at 08:05

## 2023-05-24 RX ADMIN — KETOROLAC TROMETHAMINE 30 MG: 30 INJECTION, SOLUTION INTRAMUSCULAR; INTRAVENOUS at 06:05

## 2023-05-24 RX ADMIN — ENOXAPARIN SODIUM 40 MG: 40 INJECTION SUBCUTANEOUS at 06:05

## 2023-05-24 RX ADMIN — HYDROCODONE BITARTRATE AND ACETAMINOPHEN 1 TABLET: 5; 325 TABLET ORAL at 09:05

## 2023-05-24 RX ADMIN — CEFTRIAXONE SODIUM 2 G: 2 INJECTION, POWDER, FOR SOLUTION INTRAMUSCULAR; INTRAVENOUS at 06:05

## 2023-05-24 RX ADMIN — KETOROLAC TROMETHAMINE 30 MG: 30 INJECTION, SOLUTION INTRAMUSCULAR; INTRAVENOUS at 12:05

## 2023-05-24 RX ADMIN — VANCOMYCIN HYDROCHLORIDE 2000 MG: 10 INJECTION, POWDER, LYOPHILIZED, FOR SOLUTION INTRAVENOUS at 06:05

## 2023-05-24 NOTE — PLAN OF CARE
Problem: Adult Inpatient Plan of Care  Goal: Plan of Care Review  5/24/2023 0307 by Dalia Ramon RN  Outcome: Ongoing, Progressing  5/24/2023 0307 by Dalia Ramon RN  Outcome: Ongoing, Progressing  Goal: Patient-Specific Goal (Individualized)  5/24/2023 0307 by Dalia Ramon RN  Outcome: Ongoing, Progressing  5/24/2023 0307 by Dalia Ramon RN  Outcome: Ongoing, Progressing  Goal: Absence of Hospital-Acquired Illness or Injury  5/24/2023 0307 by Dalia Ramon RN  Outcome: Ongoing, Progressing  5/24/2023 0307 by Dalia Ramon RN  Outcome: Ongoing, Progressing  Goal: Optimal Comfort and Wellbeing  5/24/2023 0307 by Dalia Ramon RN  Outcome: Ongoing, Progressing  5/24/2023 0307 by Dalia Ramon RN  Outcome: Ongoing, Progressing  Goal: Readiness for Transition of Care  5/24/2023 0307 by Dalia Ramon RN  Outcome: Ongoing, Progressing  5/24/2023 0307 by Dalia Ramon RN  Outcome: Ongoing, Progressing     Problem: Impaired Wound Healing  Goal: Optimal Wound Healing  5/24/2023 0307 by Dalia Ramon RN  Outcome: Ongoing, Progressing  5/24/2023 0307 by Dalia Ramon RN  Outcome: Ongoing, Progressing     Problem: Pain Acute  Goal: Acceptable Pain Control and Functional Ability  5/24/2023 0307 by Dalia Ramon RN  Outcome: Ongoing, Progressing  5/24/2023 0307 by Dalia Ramon RN  Outcome: Ongoing, Progressing

## 2023-05-24 NOTE — PROGRESS NOTES
[facility] Hospital Medicine Progress Note        Chief Complaint: Inpatient Follow-up    HPI:   32-year-old male with no significant past medical history was admitted to hospitalist medicine service on 05/22 with complaints of right finger swelling, redness and pain.  Patient works on car window tinting.  Patient was seen with similar complaints in outlying facility on 05/18.  Symptoms started after working on a spare tire paid patient had incision and drainage done in outlying facility and was discharged on p.o. clindamycin, narcotic paid still had persistent complaints and therefore presented back to the ED on 05/19.  He was given a dose of IV vancomycin and was discharged home.  Prescribed topical bacitracin paid since the patient did not have any significant improvement he decided to come back to the ED on 05/22.  Exam consistent with persistent abscess.  Patient was admitted hospitalist medicine service.  Orthopedics consulted.  Initiated broad-spectrum antimicrobials-Zosyn, vancomycin later deescalated to ceftriaxone, vancomycin.  Patient underwent incision and drainage of the abscess by Orthopedics on 05/22.  Awaiting cultures .  symptomatically improving.    Interval Hx:   Patient seen at bedside.  Comfortably laying in bed.  Right finger swelling, redness persist, but improving compared to admit . no other new complaints . patient is afebrile, hemodynamically stable    Objective/physical exam:  General: In no acute distress, afebrile  Chest: Clear to auscultation bilaterally  Heart: S1, S2, no appreciable murmur  Abdomen: Soft, nontender, BS +  MSK:  Right index finger swollen, red   Neurologic: Alert and oriented x4, moving all extremities with good strength     VITAL SIGNS: 24 HRS MIN & MAX LAST   Temp  Min: 97.8 °F (36.6 °C)  Max: 98.3 °F (36.8 °C) 98.3 °F (36.8 °C)   BP  Min: 121/78  Max: 142/86 121/78   Pulse  Min: 70  Max: 89  70   Resp  Min: 17  Max: 20 17   SpO2  Min:  96 %  Max: 100 % 99 %       Recent Labs   Lab 05/23/23  0330   WBC 6.79   RBC 5.77   HGB 16.6   HCT 50.4   MCV 87.3   MCH 28.8   MCHC 32.9*   RDW 12.2      MPV 9.9         Recent Labs   Lab 05/22/23  0202 05/23/23  0330    137   K 4.4 4.8   CO2 26 27   BUN 11.9 11.3   CREATININE 0.83 0.77   CALCIUM 9.8 9.6   MG 2.20  --    ALBUMIN 3.8  --    ALKPHOS 100  --    ALT 46  --    AST 31  --    BILITOT 0.8  --           Microbiology Results (last 7 days)       Procedure Component Value Units Date/Time    Blood culture #2 **CANNOT BE ORDERED STAT** [[id number]]  (Normal) Collected: 05/22/23 0440    Order Status: Completed Specimen: Blood Updated: 05/24/23 0600     CULTURE, BLOOD ([facility]) No Growth At 48 Hours    Blood culture #1 **CANNOT BE ORDERED STAT** [[id number]]  (Normal) Collected: 05/22/23 0440    Order Status: Completed Specimen: Blood Updated: 05/24/23 0600     CULTURE, BLOOD ([facility]) No Growth At 48 Hours    Wound Culture [[id number]]  (Abnormal) Collected: 05/22/23 0517    Order Status: Completed Specimen: Drainage from Finger Updated: 05/23/23 0855     Wound Culture Moderate Staphylococcus aureus    Wound Culture [[id number]]  (Abnormal) Collected: 05/22/23 1201    Order Status: Completed Specimen: Abscess from Finger Updated: 05/23/23 0854     Wound Culture Many Staphylococcus aureus             Scheduled Med:   enoxparin  40 mg Subcutaneous Daily    ketorolac  30 mg Intravenous Q6H    mupirocin   Topical (Top) Daily    vancomycin (VANCOCIN) IVPB  2,000 mg Intravenous Q12H          Assessment/Plan:    Right index finger cellulitis with underlying abscess status post incision and drainage 5/22  MRSA infection secondary to above  Right index finger swelling and pain secondary to above   Prophylaxis    Spoke to patient's mother, she reports that the patient was bitten by something while he was changing tire and he developed symptoms after the bite  Wound cultures-MRSA  DC ceftriaxone  Continue IV vancomycin    Await sensitivities   Orthopedics following, appreciate recommendations   Continue pain control  DVT prophylaxis-Donna Forte MD   05/24/2023          Update   I was told by the nursing staff that the patient coughed up blood-tinged sputum   Will get cxr

## 2023-05-24 NOTE — PROGRESS NOTES
[facility]  Wound Care    Patient Name:  Samir Zazueta   MRN:  [id number]  Date: 05/23/2023  Diagnosis: Cellulitis of index finger, right    History:     No past medical history on file.    Social History     Socioeconomic History    Marital status:    Tobacco Use    Smoking status: Every Day     Types: Cigarettes    Smokeless tobacco: Never       Precautions:     Allergies as of 05/22/2023    (No Known Allergies)       WO Assessment Details/Treatment        05/23/23 1551        Altered Skin Integrity 05/22/23 0219 Right Hand #1 Abscess   Date First Assessed/Time First Assessed: 05/22/23 0219   Side: Right  Location: Hand  Wound Number: #1  Primary Wound Type: Abscess   Wound Image    Dressing Appearance Dry;Intact;Clean   Drainage Amount Small   Drainage Characteristics/Odor Purulent;Serosanguineous   Appearance Red;Yellow;Slough   Tissue loss description Full thickness   Red (%), Wound Tissue Color 20 %   Yellow (%), Wound Tissue Color 80 %   Periwound Area Edematous;Redness;Warm;Moist   Wound Edges Defined   Wound Length (cm) 2 cm   Wound Width (cm) 0.5 cm   Wound Depth (cm) 1.5 cm   Wound Volume (cm^3) 1.5 cm^3   Wound Surface Area (cm^2) 1 cm^2   Care Cleansed with:;Sterile normal saline   Dressing Applied;Calcium alginate;Silver     WOCN consulted for right index finger. Mom at bedside. Treatment recommendations put into place. Right index finger: Cleanse with vashe. Apply Bactroban ointment. Pack with calcium alginate with AG, cover with 4x4, wrap with kerlix. Secure with medipore tape. Change daily.Patient educated on dressing and keeping hand elevated. He voices understanding. Will follow up    05/23/2023

## 2023-05-24 NOTE — PROGRESS NOTES
"[facility]  Orthopedics  Progress Note    Patient Name: Samir Zazueta  MRN: [id number]  Admission Date: 5/22/2023  Hospital Length of Stay: 0 days  Attending Provider: Antonia Krishnamurthy MD  Primary Care Provider: Debby Elizondo MD    Subjective:     Principal Problem:Cellulitis of index finger, right    Principal Orthopedic Problem: abscess/cellulitis right index finger    Interval History: POD 2 I&D right index finger. Pt states he has no pain to the right index finger. States he does not feel it all. Dressing was changed last night. He has no fever/chills. No new complaints.     Review of patient's allergies indicates:  No Known Allergies    Current Facility-Administered Medications   Medication    acetaminophen tablet 1,000 mg    acetaminophen tablet 650 mg    aluminum-magnesium hydroxide-simethicone 200-200-20 mg/5 mL suspension 30 mL    cloNIDine tablet 0.2 mg    enoxaparin injection 40 mg    hydrALAZINE injection 10 mg    HYDROcodone-acetaminophen 5-325 mg per tablet 1 tablet    HYDROmorphone (PF) injection 1 mg    ketorolac injection 30 mg    melatonin tablet 6 mg    mupirocin 2 % ointment    ondansetron injection 4 mg    polyethylene glycol packet 17 g    prochlorperazine injection Soln 5 mg    senna-docusate 8.6-50 mg per tablet 2 tablet    sodium chloride 0.9% flush 10 mL    vancomycin (VANCOCIN) 2,000 mg in dextrose 5 % (D5W) 500 mL IVPB    vancomycin - pharmacy to dose     Objective:     Vital Signs (Most Recent):  Temp: 98.3 °F (36.8 °C) (05/24/23 0803)  Pulse: 70 (05/24/23 0803)  Resp: 17 (05/24/23 0803)  BP: 121/78 (05/24/23 0803)  SpO2: 99 % (05/24/23 0803) Vital Signs (24h Range):  Temp:  [97.8 °F (36.6 °C)-98.3 °F (36.8 °C)] 98.3 °F (36.8 °C)  Pulse:  [70-89] 70  Resp:  [17-20] 17  SpO2:  [96 %-100 %] 99 %  BP: (121-142)/(66-86) 121/78     Weight: 100 kg (220 lb 7.4 oz)  Height: 6' 0.05" (183 cm)  Body mass index is 29.86 kg/m².      Intake/Output Summary (Last 24 " hours) at 5/24/2023 1023  Last data filed at 5/24/2023 0537  Gross per 24 hour   Intake 620 ml   Output --   Net 620 ml       Ortho/SPM Exam  General: AAO. Resp even and non labored. Skin pink, warm, dry. No distress.     Right index finger:  Dressing removed.   Thick fibrinous exudate expressed from incision. No significant purulence.  Erythema improving compared to initial eval  Swelling improving compared to initial eval  Limitations in ROM of the digit s/t swelling/soreness but motor intact  Brisk cap refill distally    Significant Labs: CBC:   Recent Labs   Lab 05/23/23  0330   WBC 6.79   HGB 16.6   HCT 50.4        CMP:   Recent Labs   Lab 05/23/23  0330      K 4.8   CO2 27   BUN 11.3   CREATININE 0.77   CALCIUM 9.6     All pertinent labs within the past 24 hours have been reviewed.    Significant Imaging: None    Assessment/Plan:     Active Diagnoses:    Diagnosis Date Noted POA    PRINCIPAL PROBLEM:  Cellulitis of index finger, right [L03.011] 05/22/2023 Yes      Problems Resolved During this Admission:     Right index finger cellulitis/abscess improving. Continue packing/dressing changes daily. Cultures growing MRSA. Continue abx per primary team. Ok for ROM exercises to the digits. Continue elevation for swelling.     The above findings, diagnosis, and treatment plan were discussed with Dr. Sher Lopez who is in agreement.      MARKIE Cardona  Orthopedics  [facility]

## 2023-05-25 VITALS
WEIGHT: 220.44 LBS | RESPIRATION RATE: 19 BRPM | BODY MASS INDEX: 29.86 KG/M2 | SYSTOLIC BLOOD PRESSURE: 108 MMHG | HEIGHT: 72 IN | OXYGEN SATURATION: 100 % | DIASTOLIC BLOOD PRESSURE: 60 MMHG | HEART RATE: 80 BPM | TEMPERATURE: 97 F

## 2023-05-25 LAB — VANCOMYCIN TROUGH SERPL-MCNC: 13.6 UG/ML (ref 15–20)

## 2023-05-25 PROCEDURE — 63600175 PHARM REV CODE 636 W HCPCS: Performed by: INTERNAL MEDICINE

## 2023-05-25 PROCEDURE — 99222 1ST HOSP IP/OBS MODERATE 55: CPT | Mod: ,,, | Performed by: NURSE PRACTITIONER

## 2023-05-25 PROCEDURE — 80202 ASSAY OF VANCOMYCIN: CPT | Performed by: INTERNAL MEDICINE

## 2023-05-25 PROCEDURE — 25000003 PHARM REV CODE 250: Performed by: INTERNAL MEDICINE

## 2023-05-25 PROCEDURE — 99222 PR INITIAL HOSPITAL CARE,LEVL II: ICD-10-PCS | Mod: ,,, | Performed by: NURSE PRACTITIONER

## 2023-05-25 RX ORDER — HYDROCODONE BITARTRATE AND ACETAMINOPHEN 5; 325 MG/1; MG/1
1 TABLET ORAL EVERY 6 HOURS PRN
Qty: 28 TABLET | Refills: 0 | Status: SHIPPED | OUTPATIENT
Start: 2023-05-25 | End: 2023-06-01

## 2023-05-25 RX ORDER — LINEZOLID 600 MG/1
600 TABLET, FILM COATED ORAL EVERY 12 HOURS
Qty: 56 TABLET | Refills: 0 | Status: SHIPPED | OUTPATIENT
Start: 2023-05-25 | End: 2023-06-22

## 2023-05-25 RX ADMIN — KETOROLAC TROMETHAMINE 30 MG: 30 INJECTION, SOLUTION INTRAMUSCULAR; INTRAVENOUS at 06:05

## 2023-05-25 RX ADMIN — KETOROLAC TROMETHAMINE 30 MG: 30 INJECTION, SOLUTION INTRAMUSCULAR; INTRAVENOUS at 01:05

## 2023-05-25 RX ADMIN — VANCOMYCIN HYDROCHLORIDE 1500 MG: 1.5 INJECTION, POWDER, LYOPHILIZED, FOR SOLUTION INTRAVENOUS at 12:05

## 2023-05-25 RX ADMIN — MUPIROCIN: 20 OINTMENT TOPICAL at 12:05

## 2023-05-25 NOTE — PROGRESS NOTES
"Patient Name: Samir Zazueta  MRN: [id number]  Admission Date: 5/22/2023  Hospital Length of Stay: 1 days  Attending Provider: Renuka Forte MD  Primary Care Provider: Debby Elizondo MD    Subjective:       Principal Orthopedic Problem:  Right index finger abscess    Interval History:  No acute issues reported overnight.  Patient is sitting up eating breakfast this morning.  States that he has pain in the finger as expected.    Review of patient's allergies indicates:  No Known Allergies    Current Facility-Administered Medications   Medication    acetaminophen tablet 1,000 mg    acetaminophen tablet 650 mg    aluminum-magnesium hydroxide-simethicone 200-200-20 mg/5 mL suspension 30 mL    cloNIDine tablet 0.2 mg    enoxaparin injection 40 mg    hydrALAZINE injection 10 mg    HYDROcodone-acetaminophen 5-325 mg per tablet 1 tablet    HYDROmorphone (PF) injection 1 mg    ketorolac injection 30 mg    melatonin tablet 6 mg    mupirocin 2 % ointment    ondansetron injection 4 mg    polyethylene glycol packet 17 g    prochlorperazine injection Soln 5 mg    senna-docusate 8.6-50 mg per tablet 2 tablet    sodium chloride 0.9% flush 10 mL    vancomycin - pharmacy to dose    vancomycin 1.5 g in dextrose 5 % 250 mL IVPB (ready to mix)     Objective:     Vital Signs (Most Recent):  Temp: 97.6 °F (36.4 °C) (05/25/23 0748)  Pulse: 80 (05/25/23 0748)  Resp: 20 (05/25/23 0748)  BP: 130/74 (05/25/23 0748)  SpO2: 99 % (05/25/23 0748) Vital Signs (24h Range):  Temp:  [97.6 °F (36.4 °C)-98.6 °F (37 °C)] 97.6 °F (36.4 °C)  Pulse:  [73-97] 80  Resp:  [18-20] 20  SpO2:  [97 %-100 %] 99 %  BP: (114-150)/(58-79) 130/74     Weight: 100 kg (220 lb 7.4 oz)  Height: 6' 0.05" (183 cm)  Body mass index is 29.86 kg/m².    No intake or output data in the 24 hours ending 05/25/23 0930    Physical Exam:   Ortho/SPM Exam    General the patient is alert and oriented x3 no acute distress nontoxic-appearing appropriate affect.    Constitutional: Vital " signs are examined and stable.  Resp: No signs of labored breathing    Musculoskeletal Exam:  Right hand: Dressings clean dry and intact.  Brisk capillary refill to the digits.  Limitations in range of motion of the index finger due to immobilization from the bandages as well as pain.    Diagnostic Findings:   Significant Labs: BMP: No results for input(s): GLU, NA, K, CL, CO2, BUN, CREATININE, CALCIUM, MG in the last 48 hours.  CBC: No results for input(s): WBC, HGB, HCT, PLT in the last 48 hours.  CMP: No results for input(s): NA, K, CL, CO2, GLU, BUN, CREATININE, CALCIUM, PROT, ALBUMIN, BILITOT, ALKPHOS, AST, ALT, ANIONGAP, EGFRNONAA in the last 48 hours.    Invalid input(s): ESTGFAFRICA  Wound Culture: No results for input(s): LABAERO in the last 48 hours.  All pertinent labs within the past 24 hours have been reviewed.        Significant Imaging: I have reviewed and interpreted all pertinent imaging results/findings.     Assessment/Plan:     Active Diagnoses:    Diagnosis Date Noted POA    PRINCIPAL PROBLEM:  Cellulitis of index finger, right [L03.011] 05/22/2023 Yes      Problems Resolved During this Admission:     Status post I&D abscess right index finger over the dorsal proximal segment.  Continue IV antibiotics.  Begin dressing changes with packing removal tomorrow.  If it continues to improve he can be transitioned to oral antibiotics and discharged home.  We will continue to monitor over the next couple of days.  No need to be NPO at this time.  No plans for further operative intervention at this time.      Sher Lopez MD  Orthopedic Trauma Surgery  [facility]

## 2023-05-25 NOTE — PLAN OF CARE
05/25/23 1242   Discharge Assessment   Assessment Type Discharge Planning Assessment   Confirmed/corrected address, phone number and insurance Yes   Source of Information patient   Does patient/caregiver understand observation status   (inpatient)   Communicated ABI with patient/caregiver Yes   Reason For Admission cellulitis to rt index finger   People in Home significant other   Facility Arrived From: home   Do you expect to return to your current living situation? Yes   Do you have help at home or someone to help you manage your care at home? Yes   Who are your caregiver(s) and their phone number(s)? Makeda/significant other   Prior to hospitilization cognitive status: Unable to Assess   Current cognitive status: Alert/Oriented   Equipment Currently Used at Home none   Readmission within 30 days? No   Patient currently being followed by outpatient case management? No   Do you currently have service(s) that help you manage your care at home? No   Do you take prescription medications? No   Do you have prescription coverage? Yes   Coverage medicaid   Do you have any problems affording any of your prescribed medications? No   Who is going to help you get home at discharge? Jen   How do you get to doctors appointments? car, drives self;family or friend will provide   Are you on dialysis? No   Discharge Plan A Home Health   Discharge Plan B Home Health   DME Needed Upon Discharge  none   Discharge Plan discussed with: Patient   Transition of Care Barriers None   Housing Stability   In the last 12 months, was there a time when you were not able to pay the mortgage or rent on time? N   In the last 12 months, was there a time when you did not have a steady place to sleep or slept in a shelter (including now)? N   Transportation Needs   In the past 12 months, has lack of transportation kept you from medical appointments or from getting medications? no   In the past 12 months, has lack of transportation kept you  from meetings, work, or from getting things needed for daily living? No   OTHER   Name(s) of People in Home Ramos sharpe     Being discharged home today will need HH for daily wound care/teach, monitor, and draw labs weekly

## 2023-05-25 NOTE — PROGRESS NOTES
"[facility]  Orthopedics  Progress Note    Patient Name: Samir Zazueta  MRN: [id number]  Admission Date: 5/22/2023  Hospital Length of Stay: 1 days  Attending Provider: Renuka Forte MD  Primary Care Provider: Debby Elizondo MD    Subjective:     Principal Problem:Cellulitis of index finger, right    Principal Orthopedic Problem: abscess/cellulitis right index finger    Interval History: POD 3 I&D right index finger. Pt states he has no pain to the right index finger.  He has no fever/chills. No new complaints.     Review of patient's allergies indicates:  No Known Allergies    Current Facility-Administered Medications   Medication    acetaminophen tablet 1,000 mg    acetaminophen tablet 650 mg    aluminum-magnesium hydroxide-simethicone 200-200-20 mg/5 mL suspension 30 mL    cloNIDine tablet 0.2 mg    enoxaparin injection 40 mg    hydrALAZINE injection 10 mg    HYDROcodone-acetaminophen 5-325 mg per tablet 1 tablet    HYDROmorphone (PF) injection 1 mg    ketorolac injection 30 mg    melatonin tablet 6 mg    mupirocin 2 % ointment    ondansetron injection 4 mg    polyethylene glycol packet 17 g    prochlorperazine injection Soln 5 mg    senna-docusate 8.6-50 mg per tablet 2 tablet    sodium chloride 0.9% flush 10 mL    vancomycin - pharmacy to dose    vancomycin 1.5 g in dextrose 5 % 250 mL IVPB (ready to mix)     Objective:     Vital Signs (Most Recent):  Temp: 97.6 °F (36.4 °C) (05/25/23 0748)  Pulse: 80 (05/25/23 0748)  Resp: 20 (05/25/23 0748)  BP: 130/74 (05/25/23 0748)  SpO2: 99 % (05/25/23 0748) Vital Signs (24h Range):  Temp:  [97.6 °F (36.4 °C)-98.6 °F (37 °C)] 97.6 °F (36.4 °C)  Pulse:  [73-97] 80  Resp:  [18-20] 20  SpO2:  [97 %-100 %] 99 %  BP: (114-150)/(58-79) 130/74     Weight: 100 kg (220 lb 7.4 oz)  Height: 6' 0.05" (183 cm)  Body mass index is 29.86 kg/m².    No intake or output data in the 24 hours ending 05/25/23 0931      Ortho/SPM Exam  General: AAO. Resp even " and non labored. Skin pink, warm, dry. No distress.     Right index finger:  Dressing removed.   Mild bloody drainage to packing. No purulence.   Erythema continues to improve; minimal erythema noted  Swelling continues to improve  ROM to digit improved  Brisk cap refill distally    Significant Labs: CBC:   No results for input(s): WBC, HGB, HCT, PLT in the last 48 hours.    CMP:   No results for input(s): NA, K, CL, CO2, GLU, BUN, CREATININE, CALCIUM, PROT, ALBUMIN, BILITOT, ALKPHOS, AST, ALT, ANIONGAP, EGFRNONAA in the last 48 hours.    Invalid input(s): ESTGFAFRICA    All pertinent labs within the past 24 hours have been reviewed.    Significant Imaging: None    Assessment/Plan:     Active Diagnoses:    Diagnosis Date Noted POA    PRINCIPAL PROBLEM:  Cellulitis of index finger, right [L03.011] 05/22/2023 Yes      Problems Resolved During this Admission:     Right index finger cellulitis/abscess improving. Discontinue packing wound. Begin wet to dry dressings daily. Cultures with MRSA, sensitive to vanc and linezolid. Ok to transition to PO abx and dc home when primary team ready. F/u with ortho PRN.    The above findings, diagnosis, and treatment plan were discussed with Dr. Sher Lopez who is in agreement.      MARKIE Cardona  Orthopedics  [facility]

## 2023-05-25 NOTE — PROGRESS NOTES
Pharmacokinetic Assessment Follow Up: IV Vancomycin    Vancomycin serum concentration assessment(s):    The trough level was drawn correctly and can be used to guide therapy at this time. The measurement is below the desired definitive target range of 15 to 20 mcg/mL.    Vancomycin Regimen Plan:    Change regimen to Vancomycin 1500 mg IV every 8 hours with next serum trough concentration measured at 0900 prior to 4th dose on 05/26  vancomycin given in the last 96 hours                     vancomycin (VANCOCIN) 2,000 mg in dextrose 5 % (D5W) 500 mL IVPB (mg) 2,000 mg New Bag 05/24/23 2051     2,000 mg New Bag  0619     2,000 mg New Bag 05/23/23 1901    vancomycin (VANCOCIN) 1,750 mg in dextrose 5 % (D5W) 500 mL IVPB (mg) 1,750 mg New Bag 05/23/23 0600     1,750 mg New Bag 05/22/23 1750    vancomycin (VANCOCIN) 2,250 mg in dextrose 5 % (D5W) 500 mL IVPB (mg) 2,250 mg New Bag 05/22/23 0338                    Drug levels (last 3 results):  Recent Labs   Lab Result Units 05/23/23  1557 05/25/23  0837   Vancomycin Trough ug/ml 11.9* 13.6*       Pharmacy will continue to follow and monitor vancomycin.    Please contact pharmacy at extension [phone] for questions regarding this assessment.    Thank you for the consult,   Jennifer Alonso       Patient brief summary:  Samir Zazueta is a 32 y.o. male initiated on antimicrobial therapy with IV Vancomycin for treatment of skin & soft tissue infection    The patient's current regimen is 2000mg q12h    Drug Allergies:   Review of patient's allergies indicates:  No Known Allergies    Actual Body Weight:   100kg    Renal Function:   Estimated Creatinine Clearance: 170.3 mL/min (based on SCr of 0.77 mg/dL).,     Dialysis Method (if applicable):  N/A    CBC (last 72 hours):  Recent Labs   Lab Result Units 05/23/23  0330   WBC x10(3)/mcL 6.79   Hgb g/dL 16.6   Hct % 50.4   Platelet x10(3)/mcL 249       Metabolic Panel (last 72 hours):  Recent Labs   Lab Result Units 05/23/23  0330    Sodium Level mmol/L 137   Potassium Level mmol/L 4.8   Chloride mmol/L 100   Carbon Dioxide mmol/L 27   Glucose Level mg/dL 89   Blood Urea Nitrogen mg/dL 11.3   Creatinine mg/dL 0.77       Vancomycin Administrations:  vancomycin given in the last 96 hours                     vancomycin (VANCOCIN) 2,000 mg in dextrose 5 % (D5W) 500 mL IVPB (mg) 2,000 mg New Bag 05/24/23 2051     2,000 mg New Bag  0619     2,000 mg New Bag 05/23/23 1901    vancomycin (VANCOCIN) 1,750 mg in dextrose 5 % (D5W) 500 mL IVPB (mg) 1,750 mg New Bag 05/23/23 0600     1,750 mg New Bag 05/22/23 1750    vancomycin (VANCOCIN) 2,250 mg in dextrose 5 % (D5W) 500 mL IVPB (mg) 2,250 mg New Bag 05/22/23 0338                    Microbiologic Results:  Microbiology Results (last 7 days)       Procedure Component Value Units Date/Time    Blood culture #2 **CANNOT BE ORDERED STAT** [[id number]]  (Normal) Collected: 05/22/23 0440    Order Status: Completed Specimen: Blood Updated: 05/25/23 0600     CULTURE, BLOOD ([facility]) No Growth At 72 Hours    Blood culture #1 **CANNOT BE ORDERED STAT** [[id number]]  (Normal) Collected: 05/22/23 0440    Order Status: Completed Specimen: Blood Updated: 05/25/23 0600     CULTURE, BLOOD ([facility]) No Growth At 72 Hours    Wound Culture [[id number]]  (Abnormal)  (Susceptibility) Collected: 05/22/23 0517    Order Status: Completed Specimen: Drainage from Finger Updated: 05/24/23 0938     Wound Culture Moderate Methicillin resistant Staphylococcus aureus    Wound Culture [[id number]]  (Abnormal)  (Susceptibility) Collected: 05/22/23 1201    Order Status: Completed Specimen: Abscess from Finger Updated: 05/24/23 0937     Wound Culture Many Methicillin resistant Staphylococcus aureus

## 2023-05-25 NOTE — PROGRESS NOTES
[facility] Hospital Medicine Progress Note        Chief Complaint: Inpatient Follow-up    HPI:     32-year-old male with no significant past medical history was admitted to hospitalist medicine service on 05/22 with complaints of right finger swelling, redness and pain.  Patient works on car window tinting.  Patient was seen with similar complaints in outlying facility on 05/18.  Symptoms started after working on a spare tire paid patient had incision and drainage done in outlying facility and was discharged on p.o. clindamycin, narcotic paid still had persistent complaints and therefore presented back to the ED on 05/19.  He was given a dose of IV vancomycin and was discharged home.  Prescribed topical bacitracin paid since the patient did not have any significant improvement he decided to come back to the ED on 05/22.  Exam consistent with persistent abscess.  Patient was admitted hospitalist medicine service.  Orthopedics consulted.  Initiated broad-spectrum antimicrobials-Zosyn, vancomycin later deescalated to ceftriaxone, vancomycin.  Patient underwent incision and drainage of the abscess by Orthopedics on 05/22.  Awaiting cultures .  symptomatically improving.  Cultures-MRSA, awaiting sensitivity.  Antibiotics de-escalated to just vancomycin . Patient's mother reported that he had a bite which led to the cellulitis/abscess , Not sure if  it was animals versus spider bite.    Interval Hx:     Patient seen at bedside.  Comfortably laying in bed.  right index finger swelling is better today, redness persist .  afebrile, hemodynamics stable.    Objective/physical exam:  General: In no acute distress, afebrile  Chest: Clear to auscultation bilaterally  Heart: S1, S2, no appreciable murmur  Abdomen: Soft, nontender, BS +  MSK:  Right index finger swollen, red   Neurologic: Alert and oriented x4, moving all extremities with good strength     VITAL SIGNS: 24 HRS MIN & MAX LAST   Temp   Min: 97.8 °F (36.6 °C)  Max: 98.6 °F (37 °C) 98.2 °F (36.8 °C)   BP  Min: 114/58  Max: 150/79 (!) 114/58   Pulse  Min: 70  Max: 97  77   Resp  Min: 17  Max: 19 18   SpO2  Min: 97 %  Max: 100 % 99 %       Recent Labs   Lab 05/23/23  0330   WBC 6.79   RBC 5.77   HGB 16.6   HCT 50.4   MCV 87.3   MCH 28.8   MCHC 32.9*   RDW 12.2      MPV 9.9         Recent Labs   Lab 05/22/23  0202 05/23/23  0330    137   K 4.4 4.8   CO2 26 27   BUN 11.9 11.3   CREATININE 0.83 0.77   CALCIUM 9.8 9.6   MG 2.20  --    ALBUMIN 3.8  --    ALKPHOS 100  --    ALT 46  --    AST 31  --    BILITOT 0.8  --           Microbiology Results (last 7 days)       Procedure Component Value Units Date/Time    Blood culture #2 **CANNOT BE ORDERED STAT** [[id number]]  (Normal) Collected: 05/22/23 0440    Order Status: Completed Specimen: Blood Updated: 05/25/23 0600     CULTURE, BLOOD ([facility]) No Growth At 72 Hours    Blood culture #1 **CANNOT BE ORDERED STAT** [[id number]]  (Normal) Collected: 05/22/23 0440    Order Status: Completed Specimen: Blood Updated: 05/25/23 0600     CULTURE, BLOOD ([facility]) No Growth At 72 Hours    Wound Culture [[id number]]  (Abnormal)  (Susceptibility) Collected: 05/22/23 0517    Order Status: Completed Specimen: Drainage from Finger Updated: 05/24/23 0938     Wound Culture Moderate Methicillin resistant Staphylococcus aureus    Wound Culture [[id number]]  (Abnormal)  (Susceptibility) Collected: 05/22/23 1201    Order Status: Completed Specimen: Abscess from Finger Updated: 05/24/23 0937     Wound Culture Many Methicillin resistant Staphylococcus aureus             Scheduled Med:   enoxparin  40 mg Subcutaneous Daily    ketorolac  30 mg Intravenous Q6H    mupirocin   Topical (Top) Daily    vancomycin (VANCOCIN) IVPB  2,000 mg Intravenous Q12H          Assessment/Plan:    Right index finger cellulitis with underlying abscess status post incision and drainage 5/22  MRSA infection secondary to above  Right index finger swelling  and pain secondary to above   Prophylaxis    patient was bitten by something while he was changing tire and he developed symptoms after the bite (unsure if it was animal/Rodent/spider bite)  Wound cultures-MRSA  Patient on vancomycin   Sensitivities reviewed   Only sensitive to vanc and Zyvox   Given the severity of infection and also history of bite I will consult Infectious Disease for course and duration  Await recommendations  Swelling improving, redness persist  Orthopedics following, appreciate recommendations   Continue pain control  DVT prophylaxis-Donna Forte MD   05/25/2023          Update   I was told by the nursing staff that the patient coughed up blood-tinged sputum   Will get cxr

## 2023-05-25 NOTE — DISCHARGE SUMMARY
[facility] Hospital Medicine Discharge Summary    Admit Date: 5/22/2023  Discharge Date and Time: 5/25/202311:54 AM  Admitting Physician: JANNETH Team  Discharging Physician: Renuka Forte MD.  Primary Care Physician: Debby Elizondo MD      Discharge Diagnoses:  Right index finger cellulitis with underlying abscess status post incision and drainage 5/22  MRSA infection secondary to above  Right index finger swelling and pain secondary to above     Hospital Course:   32-year-old male with no significant past medical history was admitted to hospitalist medicine service on 05/22 with complaints of right finger swelling, redness and pain.  Patient works on car window tinting.  Patient was seen with similar complaints in outlying facility on 05/18.  Symptoms started after working on a spare tire paid patient had incision and drainage done in outlying facility and was discharged on p.o. clindamycin, narcotic paid still had persistent complaints and therefore presented back to the ED on 05/19.  He was given a dose of IV vancomycin and was discharged home.  Prescribed topical bacitracin paid since the patient did not have any significant improvement he decided to come back to the ED on 05/22.  Exam consistent with persistent abscess.  Patient was admitted hospitalist medicine service.  Orthopedics consulted.  Initiated broad-spectrum antimicrobials-Zosyn, vancomycin later deescalated to ceftriaxone, vancomycin.  Patient underwent incision and drainage of the abscess by Orthopedics on 05/22.  Awaiting cultures .  symptomatically improving.  Cultures-MRSA, awaiting sensitivity.  Antibiotics de-escalated to just vancomycin . Patient's mother reported that he had a bite which led to the cellulitis/abscess , Not sure if  it was animals versus spider bite.  Wound cultures-MRSA.  On vancomycin.  Sensitivities reviewed.  Only sensitive to vanc, Zyvox paid given severity of infection and history of bite  decided to consult Infectious Disease paid swelling improving.  Redness persist.  Orthopedics also following . pain control continued paid patient evaluated by Infectious Disease paid recommended to discharge home on Zyvox for 4 more weeks.  Home health/wound care arranged by case management.  Patient cleared for discharge by Infectious Disease.  Symptomatically, hemodynamically stable.  No fever spikes.  Labs stable.  Therefore it was decided to discharge the patient home on Zyvox as recommended by Infectious Disease.  Follow-up with Orthopedics, primary care physician.  Treatment plans discussed with the patient and he voiced understanding to everything explained.  Vitals:  VITAL SIGNS: 24 HRS MIN & MAX LAST   Temp  Min: 97.4 °F (36.3 °C)  Max: 98.6 °F (37 °C) 97.4 °F (36.3 °C)   BP  Min: 108/60  Max: 150/79 108/60   Pulse  Min: 73  Max: 97  80   Resp  Min: 18  Max: 20 19   SpO2  Min: 97 %  Max: 100 % 100 %       Physical Exam:  General: In no acute distress, afebrile  Chest: Clear to auscultation bilaterally  Heart: S1, S2, no appreciable murmur  Abdomen: Soft, nontender, BS +  MSK:  Right index finger swelling/redness improving   Neurologic: Alert and oriented x4, moving all extremities with good strength     Procedures Performed: No admission procedures for hospital encounter.     Significant Diagnostic Studies: See Full reports for all details    Recent Labs   Lab 05/22/23  0244 05/23/23  0330   WBC 9.48 6.79   RBC 5.68 5.77   HGB 16.4 16.6   HCT 49.6 50.4   MCV 87.3 87.3   MCH 28.9 28.8   MCHC 33.1 32.9*   RDW 12.3 12.2    249   MPV 9.7 9.9       Recent Labs   Lab 05/22/23  0202 05/23/23  0330    137   K 4.4 4.8   CO2 26 27   BUN 11.9 11.3   CREATININE 0.83 0.77   CALCIUM 9.8 9.6   MG 2.20  --    ALBUMIN 3.8  --    ALKPHOS 100  --    ALT 46  --    AST 31  --    BILITOT 0.8  --         Microbiology Results (last 7 days)       Procedure Component Value Units Date/Time    Blood culture #2 **CANNOT  BE ORDERED STAT** [[id number]]  (Normal) Collected: 05/22/23 0440    Order Status: Completed Specimen: Blood Updated: 05/25/23 0600     CULTURE, BLOOD ([facility]) No Growth At 72 Hours    Blood culture #1 **CANNOT BE ORDERED STAT** [[id number]]  (Normal) Collected: 05/22/23 0440    Order Status: Completed Specimen: Blood Updated: 05/25/23 0600     CULTURE, BLOOD ([facility]) No Growth At 72 Hours    Wound Culture [[id number]]  (Abnormal)  (Susceptibility) Collected: 05/22/23 0517    Order Status: Completed Specimen: Drainage from Finger Updated: 05/24/23 0938     Wound Culture Moderate Methicillin resistant Staphylococcus aureus    Wound Culture [[id number]]  (Abnormal)  (Susceptibility) Collected: 05/22/23 1201    Order Status: Completed Specimen: Abscess from Finger Updated: 05/24/23 0937     Wound Culture Many Methicillin resistant Staphylococcus aureus             X-Ray Chest 1 View  Narrative: EXAMINATION:  XR CHEST 1 VIEW    CLINICAL HISTORY:  Blood-tinged sputum;    TECHNIQUE:  Single view of the chest    COMPARISON:  04/29/2023    FINDINGS:  No focal opacification, pleural effusion, or pneumothorax.    The cardiomediastinal silhouette is within normal limits.    No acute osseous abnormality.  Impression: No acute cardiopulmonary process.    Electronically signed by: Magnus Wright  Date:    05/24/2023  Time:    15:14         Medication List        START taking these medications      HYDROcodone-acetaminophen 5-325 mg per tablet  Commonly known as: NORCO  Take 1 tablet by mouth every 6 (six) hours as needed for Pain.     linezolid 600 mg Tab  Commonly known as: ZYVOX  Take 1 tablet (600 mg total) by mouth every 12 (twelve) hours.            CONTINUE taking these medications      esomeprazole 20 MG capsule  Commonly known as: NEXIUM            STOP taking these medications      bacitracin 500 unit/gram Oint     clindamycin 150 MG capsule  Commonly known as: CLEOCIN     silver sulfADIAZINE 1% 1 % cream  Commonly known as:  SILVADENE     traMADoL 50 mg tablet  Commonly known as: ULTRAM               Where to Get Your Medications        These medications were sent to [facility] - [place] - [address]  [address], [place] [address]      Phone: [phone]   HYDROcodone-acetaminophen 5-325 mg per tablet  linezolid 600 mg Tab          Explained in detail to the patient about the discharge plan, medications, and follow-up visits. Pt understands and agrees with the treatment plan  Discharge Disposition: Home or Self Care   Discharged Condition: stable  Diet-   Dietary Orders (From admission, onward)       Start     Ordered    05/22/23 0513  Diet Adult Regular  (Diet/Nutrition [facility])  Diet effective now         05/22/23 0512                   Medications Per DC med rec  Activities as tolerated   Follow-up Information       Vinh Rodrigues, DO Follow up in 1 week(s).    Specialty: Orthopedic Surgery  Contact information:  [address]  [address]  [facility] [id number] [phone]               Debby Elizondo MD Follow up in 1 week(s).    Specialty: Family Medicine  Contact information:  [address] [facility] [address] [phone]                           For further questions contact hospitalist office    Discharge time 33 minutes    For worsening symptoms, chest pain, shortness of breath, increased abdominal pain, high grade fever, stroke or stroke like symptoms, immediately go to the nearest Emergency Room or call 911 as soon as possible.      Renuka East M.D, on 5/25/2023. at 11:54 AM.

## 2023-05-25 NOTE — CONSULTS
[facility]  Infectious Disease Consult        HISTORY OF PRESENT ILLNESS:   He is a 32-year-old male with no past medical history who reports recently working on his car, apparently in a field, where he was possibly bitten on the right index finger.  Finger became painful, edematous, and erythematous and he was seen at an outlying facility on 05/18.  Underwent an I&D at that time and was given a prescription for clindamycin.  Appearance worsened, and patient subsequently presented here on 05/22 for further evaluation.  X-ray revealed significant soft tissue swelling, however otherwise was unremarkable.  He was evaluated by Orthopedic surgery when I and D was performed at the bedside on admit-purosanguineous drainage noted.  Cultures positive for MRSA, only sensitive to Zyvox and vancomycin.  He has been afebrile and hemodynamically stable.  Currently receiving vancomycin.  We have been consulted for further assistance.    Denies any systemic symptoms recently or currently.  No complaints at present.  Girlfriend at bedside.       PAST MEDICAL HISTORY:   No past medical history on file.    PAST SURGICAL HISTORY:     Past Surgical History:   Procedure Laterality Date    LAPAROSCOPIC CHOLECYSTECTOMY N/A 7/31/2022    Procedure: CHOLECYSTECTOMY, LAPAROSCOPIC;  Surgeon: Jai Kitchen Jr., MD;  Location: [facility];  Service: General;  Laterality: N/A;       ALLERGIES:   Patient has no known allergies.    FAMILY HISTORY:   Reviewed and non-contributory     SOCIAL HISTORY:     Social History     Tobacco Use    Smoking status: Every Day     Types: Cigarettes    Smokeless tobacco: Never   Substance Use Topics    Alcohol use: Not on file        MEDICATIONS:   Reviewed in EMR    REVIEW OF SYSTEMS:   Except as documented, all other systems reviewed and negative     PHYSICAL EXAM:   T 97.6 °F (36.4 °C)   /74   P 80   RR 20   O2 99 %  GENERAL: awake, alert, oriented and in no acute distress,  non-toxic appearing   HEENT: normocephalic atraumatic   NECK: supple   LUNGS: Clear bilaterally, no wheezing or rales, no accessory muscle use   CVS: Regular rate and rhythm, normal peripheral perfusion  ABD: Soft, non-tender, non-distended, bowel sounds present  EXTREMITIES: no clubbing or cyanosis  SKIN: Warm, dry; R hand dressing in place - no surrounding erythema noted  NEURO: alert and oriented, grossly without focal deficits   PSYCHIATRIC: Cooperative    LABS AND IMAGING:     Recent Labs     05/23/23  0330   WBC 6.79   RBC 5.77   HGB 16.6   HCT 50.4   MCV 87.3   MCH 28.8   MCHC 32.9*   RDW 12.2        No results for input(s): LACTIC in the last 72 hours.  No results for input(s): INR, APTT, D-DIMER in the last 72 hours.  No results for input(s): HGBA1C, CHOL, TRIG, LDL, VLDL, HDL in the last 72 hours.   Recent Labs     05/23/23  0330      K 4.8   CHLORIDE 100   CO2 27   BUN 11.3   CREATININE 0.77   GLUCOSE 89   CALCIUM 9.6     No results for input(s): BNP, CPK, TROPONINI in the last 72 hours.       X-Ray Chest 1 View  Narrative: EXAMINATION:  XR CHEST 1 VIEW    CLINICAL HISTORY:  Blood-tinged sputum;    TECHNIQUE:  Single view of the chest    COMPARISON:  04/29/2023    FINDINGS:  No focal opacification, pleural effusion, or pneumothorax.    The cardiomediastinal silhouette is within normal limits.    No acute osseous abnormality.  Impression: No acute cardiopulmonary process.    Electronically signed by: Magnus Wright  Date:    05/24/2023  Time:    15:14      ASSESSMENT & PLAN:   32-year-old male presenting with ongoing right index finger pain, erythema, and edema following a possible bite, despite I and D and oral antibiotics with clindamycin.  Now status post bedside I and D-cultures positive for MRSA, only sensitive to Zyvox and vancomycin.  Id consulted for assistance.    1.  Right index finger abscess / cellulitis s/t possible bite, s/p bedside I&D on 5/22 - cultures positive for MRSA, only  S to Zyvox and vancomycin      PLAN:  Transition to Zyvox 600mg PO BID for a 4 week course.   Weekly CBC and CMP while on abx.  Keep f/u with ortho as scheduled.     STI screening negative.    Tetanus updated on 5/18 per chart review.   Discussed with patient and nursing.     MARKIE Atwood  Infectious Disease

## 2023-05-25 NOTE — DISCHARGE INSTRUCTIONS
Cleanse with antibiotic soap and water. Apply Bactroban ointment, cover with saline soaked gauze, dry 4x4, wrap with kerlix. Secure with medipore tape. Change daily.

## 2023-05-26 PROCEDURE — G0180 PR HOME HEALTH MD CERTIFICATION: ICD-10-PCS | Mod: ,,, | Performed by: NURSE PRACTITIONER

## 2023-05-26 PROCEDURE — G0180 MD CERTIFICATION HHA PATIENT: HCPCS | Mod: ,,, | Performed by: NURSE PRACTITIONER

## 2023-05-27 LAB
BACTERIA BLD CULT: NORMAL
BACTERIA BLD CULT: NORMAL

## 2023-05-30 ENCOUNTER — PATIENT OUTREACH (OUTPATIENT)
Dept: ADMINISTRATIVE | Facility: CLINIC | Age: 33
End: 2023-05-30
Payer: MEDICAID

## 2023-05-30 NOTE — PROGRESS NOTES
C3 nurse attempted to contact Samir Zazueta for a TCC post hospital discharge follow up call. Patient answered but politely declined the call.  He stated he was at work and the home health nurse had already gone over everything.  The patient has a scheduled [facility] appointment with Vinh Rodrigues DO (Orthopedic Surgery) on  JUNE 7TH @ 8:30 and MARKIE Levin (Family Medicine) on 6/1/2023;  @ 9:00.

## 2023-06-01 ENCOUNTER — OFFICE VISIT (OUTPATIENT)
Dept: FAMILY MEDICINE | Facility: CLINIC | Age: 33
End: 2023-06-01
Payer: MEDICAID

## 2023-06-01 VITALS
HEART RATE: 100 BPM | BODY MASS INDEX: 28.99 KG/M2 | WEIGHT: 214 LBS | SYSTOLIC BLOOD PRESSURE: 138 MMHG | HEIGHT: 72 IN | TEMPERATURE: 98 F | DIASTOLIC BLOOD PRESSURE: 75 MMHG | RESPIRATION RATE: 18 BRPM | OXYGEN SATURATION: 95 %

## 2023-06-01 DIAGNOSIS — Z09 HOSPITAL DISCHARGE FOLLOW-UP: Primary | ICD-10-CM

## 2023-06-01 DIAGNOSIS — L03.011 CELLULITIS OF INDEX FINGER, RIGHT: ICD-10-CM

## 2023-06-01 PROCEDURE — 99213 OFFICE O/P EST LOW 20 MIN: CPT | Mod: ,,, | Performed by: NURSE PRACTITIONER

## 2023-06-01 PROCEDURE — 1159F PR MEDICATION LIST DOCUMENTED IN MEDICAL RECORD: ICD-10-PCS | Mod: CPTII,,, | Performed by: NURSE PRACTITIONER

## 2023-06-01 PROCEDURE — 3008F BODY MASS INDEX DOCD: CPT | Mod: CPTII,,, | Performed by: NURSE PRACTITIONER

## 2023-06-01 PROCEDURE — 3075F SYST BP GE 130 - 139MM HG: CPT | Mod: CPTII,,, | Performed by: NURSE PRACTITIONER

## 2023-06-01 PROCEDURE — 3044F HG A1C LEVEL LT 7.0%: CPT | Mod: CPTII,,, | Performed by: NURSE PRACTITIONER

## 2023-06-01 PROCEDURE — 3044F PR MOST RECENT HEMOGLOBIN A1C LEVEL <7.0%: ICD-10-PCS | Mod: CPTII,,, | Performed by: NURSE PRACTITIONER

## 2023-06-01 PROCEDURE — 3078F DIAST BP <80 MM HG: CPT | Mod: CPTII,,, | Performed by: NURSE PRACTITIONER

## 2023-06-01 PROCEDURE — 1111F PR DISCHARGE MEDS RECONCILED W/ CURRENT OUTPATIENT MED LIST: ICD-10-PCS | Mod: CPTII,,, | Performed by: NURSE PRACTITIONER

## 2023-06-01 PROCEDURE — 3075F PR MOST RECENT SYSTOLIC BLOOD PRESS GE 130-139MM HG: ICD-10-PCS | Mod: CPTII,,, | Performed by: NURSE PRACTITIONER

## 2023-06-01 PROCEDURE — 99213 PR OFFICE/OUTPT VISIT, EST, LEVL III, 20-29 MIN: ICD-10-PCS | Mod: ,,, | Performed by: NURSE PRACTITIONER

## 2023-06-01 PROCEDURE — 3078F PR MOST RECENT DIASTOLIC BLOOD PRESSURE < 80 MM HG: ICD-10-PCS | Mod: CPTII,,, | Performed by: NURSE PRACTITIONER

## 2023-06-01 PROCEDURE — 1111F DSCHRG MED/CURRENT MED MERGE: CPT | Mod: CPTII,,, | Performed by: NURSE PRACTITIONER

## 2023-06-01 PROCEDURE — 1159F MED LIST DOCD IN RCRD: CPT | Mod: CPTII,,, | Performed by: NURSE PRACTITIONER

## 2023-06-01 PROCEDURE — 3008F PR BODY MASS INDEX (BMI) DOCUMENTED: ICD-10-PCS | Mod: CPTII,,, | Performed by: NURSE PRACTITIONER

## 2023-06-10 ENCOUNTER — EXTERNAL HOME HEALTH (OUTPATIENT)
Dept: HOME HEALTH SERVICES | Facility: HOSPITAL | Age: 33
End: 2023-06-10
Payer: MEDICAID

## 2023-06-26 ENCOUNTER — DOCUMENT SCAN (OUTPATIENT)
Dept: HOME HEALTH SERVICES | Facility: HOSPITAL | Age: 33
End: 2023-06-26
Payer: MEDICAID

## 2023-06-29 DIAGNOSIS — Z00.00 WELLNESS EXAMINATION: Primary | ICD-10-CM

## 2023-07-13 PROBLEM — Z09 HOSPITAL DISCHARGE FOLLOW-UP: Status: ACTIVE | Noted: 2023-07-13

## 2023-07-13 NOTE — PROGRESS NOTES
"   Patient ID: [id number]     Chief Complaint: Hospital Follow Up (/)      HPI:     Samir Zazueta is a 32 y.o. male here today establish care and for hospital discharge follow-up.  On May 18, 2023 patient presented to ED with complaints of redness and swelling to right index finger after sticking his hand in a spare tire. Patient was diagnosed with cellulitis of the right index finger and I&D was performed. Patient was initiated on oral antibiotics and advised to follow-up with ortho and PCP.  Patient then returned to the ED on May 22, 2022 where he was admitted for IV antibiotics secondary to MRSA infection to right index finger.  Patient was discharged on May 25th on Zyvox 600 mg p.o. b.i.d. x4 weeks.  Patient is managed by infectious disease in ortho.  Patient is scheduled to follow-up with ortho on June 7th.  Patient also has [facility] for wound care.          Past Medical History:  has no past medical history on file.    Surgical History:  has a past surgical history that includes Laparoscopic cholecystectomy (N/A, 07/31/2022) and Plantar's wart excision.    Family History: family history is not on file.    Social History:  reports that he has been smoking cigarettes. He has been smoking an average of .5 packs per day. He has never used smokeless tobacco. He reports that he does not currently use alcohol. He reports that he does not currently use drugs.    Current Outpatient Medications   Medication Instructions    esomeprazole (NEXIUM) 20 mg, Oral, Daily       Patient has No Known Allergies.     Patient Care Team:  Debby Elizondo MD as PCP - General (Family Medicine)       Subjective:     Review of Systems    12 point review of systems conducted, negative except as stated in the history of present illness. See HPI for details.      Objective:     Visit Vitals  /75 (BP Location: Left arm, Patient Position: Sitting)   Pulse 100   Temp 98.1 °F (36.7 °C) (Oral)   Resp 18   Ht 6' 0.05" (1.83 m)   Wt " 97.1 kg (214 lb)   SpO2 95%   BMI 28.99 kg/m²       Physical Exam    General: Alert and oriented, No acute distress.  Head: Normocephalic, Atraumatic.  Eye: Pupils are equal, round and reactive to light, Extraocular movements are intact, Sclera non-icteric.  Ears/Nose/Throat: Normal, Mucosa moist,Clear.  Neck/Thyroid: Supple, Non-tender, No carotid bruit, No lymphadenopathy, No JVD, Full range of motion.  Respiratory: Clear to auscultation bilaterally; No wheezes, rales or rhonchi,Non-labored respirations, Symmetrical chest wall expansion.  Cardiovascular: Regular rate and rhythm, S1/S2 normal, No murmurs, rubs or gallops.  Gastrointestinal: Soft, Non-tender, Non-distended, Normal bowel sounds, No palpable organomegaly.  Musculoskeletal: Normal range of motion.  Integumentary: Warm, Dry, Intact, No suspicious lesions or rashes.  Right hand dressing in place, no surrounding erythema noted.  Extremities: No clubbing, cyanosis or edema  Neurologic: No focal deficits, Cranial Nerves II-XII are grossly intact, Motor strength normal upper and lower extremities, Sensory exam intact.  Psychiatric: Normal interaction, Coherent speech, Euthymic mood, Appropriate affect     Labs Reviewed:     Chemistry:  Lab Results   Component Value Date     05/23/2023    K 4.8 05/23/2023    CHLORIDE 100 05/23/2023    BUN 11.3 05/23/2023    CREATININE 0.77 05/23/2023    EGFRNORACEVR >60 05/23/2023    GLUCOSE 89 05/23/2023    CALCIUM 9.6 05/23/2023    ALKPHOS 100 05/22/2023    LABPROT 7.6 05/22/2023    ALBUMIN 3.8 05/22/2023    AST 31 05/22/2023    ALT 46 05/22/2023    MG 2.20 05/22/2023    PHOS 3.3 08/01/2022        Lab Results   Component Value Date    HGBA1C 5.0 05/22/2023        Hematology:  Lab Results   Component Value Date    WBC 6.79 05/23/2023    HGB 16.6 05/23/2023    HCT 50.4 05/23/2023     05/23/2023       Lipid Panel:  No results found for: CHOL, HDL, LDL, TRIG, TOTALCHOLEST     Urine:  Lab Results   Component Value  Date    COLORUA Yellow 07/31/2022    APPEARANCEUA Clear 07/31/2022    SGUA 1.023 07/31/2022    PHUA 7.5 07/31/2022    PROTEINUA Negative 07/31/2022    GLUCOSEUA 2+ (A) 07/31/2022    KETONESUA Negative 07/31/2022    BLOODUA Negative 07/31/2022    NITRITESUA Negative 07/31/2022    LEUKOCYTESUR Negative 07/31/2022    RBCUA <5 07/31/2022    WBCUA <5 07/31/2022    BACTERIA None Seen 07/31/2022          Assessment:       ICD-10-CM ICD-9-CM   1. Hospital discharge follow-up  Z09 V67.59   2. Cellulitis of index finger, right  L03.011 681.00        Plan:   1. Hospital discharge follow-up  Progressing as expected.  Advised patient to continue antibiotics as prescribed.  ED precautions given.    2. Cellulitis of index finger, right  Stable.  Continue antibiotics as prescribed.  Keep scheduled follow-up appointments with Infectious Disease, and ortho.      Follow up in about 6 weeks (around 7/13/2023) for Wellness. In addition to their scheduled follow up, the patient has also been instructed to follow up on as needed basis.     Future Appointments   Date Time Provider Department Center   7/21/2023  8:00 AM MARKIE Levin [facility]   8/22/2023  9:40 AM Vasquez Lyon MD [facility] [place] ID        MARKIE Levin

## 2023-09-15 ENCOUNTER — HOSPITAL ENCOUNTER (EMERGENCY)
Facility: HOSPITAL | Age: 33
Discharge: HOME | End: 2023-09-15
Attending: EMERGENCY MEDICINE
Payer: MEDICAID

## 2023-09-15 VITALS
HEIGHT: 72 IN | WEIGHT: 220 LBS | RESPIRATION RATE: 20 BRPM | BODY MASS INDEX: 29.8 KG/M2 | OXYGEN SATURATION: 100 % | DIASTOLIC BLOOD PRESSURE: 108 MMHG | TEMPERATURE: 98 F | HEART RATE: 89 BPM | SYSTOLIC BLOOD PRESSURE: 145 MMHG

## 2023-09-15 DIAGNOSIS — K04.7 DENTAL ABSCESS: Primary | ICD-10-CM

## 2023-09-15 DIAGNOSIS — S02.5XXA CLOSED FRACTURE OF TOOTH, INITIAL ENCOUNTER: ICD-10-CM

## 2023-09-15 PROCEDURE — 99284 EMERGENCY DEPT VISIT MOD MDM: CPT

## 2023-09-15 RX ORDER — HYDROCODONE BITARTRATE AND ACETAMINOPHEN 5; 325 MG/1; MG/1
1 TABLET ORAL EVERY 6 HOURS PRN
Qty: 12 TABLET | Refills: 0 | Status: SHIPPED | OUTPATIENT
Start: 2023-09-15

## 2023-09-15 RX ORDER — AMOXICILLIN AND CLAVULANATE POTASSIUM 875; 125 MG/1; MG/1
1 TABLET, FILM COATED ORAL 2 TIMES DAILY
Qty: 14 TABLET | Refills: 0 | Status: SHIPPED | OUTPATIENT
Start: 2023-09-15 | End: 2023-09-22

## 2023-09-15 NOTE — ED NOTES
Informed Snehal Bales NP of blood pressure no new orders. Pt was prescribed norco informed no alcohol or driving while on pain meds .

## 2023-09-15 NOTE — DISCHARGE INSTRUCTIONS
Augmentin twice a day  Hydrocodone as needed for pain, do not take and drive  Advil 600 mg every 6 hours for inflammation, take with food  Call one of the above [facility] clinics to see a dentist

## 2023-09-15 NOTE — ED PROVIDER NOTES
Encounter Date: 9/15/2023       History     Chief Complaint   Patient presents with    Dental Pain     Pt c/o dental pain; states he broke a tooth a few months ago.      32 year old male presents to Er complaining of right lower molar pain. He states he has had a broken tooth for awhile but today began having severe pain and swelling of gums. No fever or vomiting.     The history is provided by the patient. No  was used.     Review of patient's allergies indicates:  No Known Allergies  No past medical history on file.  Past Surgical History:   Procedure Laterality Date    LAPAROSCOPIC CHOLECYSTECTOMY N/A 07/31/2022    Procedure: CHOLECYSTECTOMY, LAPAROSCOPIC;  Surgeon: Jai Kitchen Jr., MD;  Location: [facility] OR;  Service: General;  Laterality: N/A;    PLANTAR'S WART EXCISION       No family history on file.  Social History     Tobacco Use    Smoking status: Every Day     Current packs/day: 0.50     Types: Cigarettes    Smokeless tobacco: Never   Substance Use Topics    Alcohol use: Not Currently     Comment: quit 12 yrs ago    Drug use: Not Currently     Review of Systems   Constitutional:  Negative for fever.   HENT:  Positive for dental problem.    Gastrointestinal:  Negative for vomiting.   All other systems reviewed and are negative.      Physical Exam     Initial Vitals [09/15/23 1450]   BP Pulse Resp Temp SpO2   (!) 145/108 89 20 97.9 °F (36.6 °C) 100 %      MAP       --         Physical Exam    Constitutional: He appears well-developed and well-nourished.   HENT:   Head: Normocephalic.   Mouth/Throat: No trismus in the jaw. Dental abscesses and dental caries present.       Eyes: EOM are normal.   Neck: Neck supple.   Cardiovascular:  Normal rate and regular rhythm.           Pulmonary/Chest: Breath sounds normal. No respiratory distress.   Abdominal: He exhibits no distension.   Musculoskeletal:         General: Normal range of motion.      Cervical back: Neck supple.     Neurological:  He is alert and oriented to person, place, and time.   Skin: Skin is warm and dry. Capillary refill takes less than 2 seconds.   Psychiatric: He has a normal mood and affect.         ED Course   Procedures  Labs Reviewed - No data to display       Imaging Results    None          Medications - No data to display  Medical Decision Making  DDX. Fractured tooth, dental caries, dental abscess.     Fractured tooth #30 with abscess. No fever, vomiting or trismus. Augmentin and Norco prescribed.                                Clinical Impression:   Final diagnoses:  [K04.7] Dental abscess (Primary)  [S02.5XXA] Closed fracture of tooth, initial encounter        ED Disposition Condition    Discharge Stable          ED Prescriptions       Medication Sig Dispense Start Date End Date Auth. Provider    HYDROcodone-acetaminophen (NORCO) 5-325 mg per tablet Take 1 tablet by mouth every 6 (six) hours as needed for Pain. 12 tablet 9/15/2023 -- Snehal Bales FNP    amoxicillin-clavulanate 875-125mg (AUGMENTIN) 875-125 mg per tablet Take 1 tablet by mouth 2 (two) times daily. for 7 days 14 tablet 9/15/2023 9/22/2023 Snehal Bales FNP          Follow-up Information       Follow up With Specialties Details Why Contact Info    [facility], Gynecology, Internal Medicine   [address] [place] [address] [phone]      [place], [facility] -    [address] [place] [address] [phone]               Snehal Bales FNP  09/15/23 3468

## 2023-09-15 NOTE — Clinical Note
"Samir NAGEL" Marbin was seen and treated in our emergency department on 9/15/2023.  He may return to work on 09/18/2023.       If you have any questions or concerns, please don't hesitate to call.      Snehal Bales FNP"

## 2023-10-16 PROBLEM — Z09 HOSPITAL DISCHARGE FOLLOW-UP: Status: RESOLVED | Noted: 2023-07-13 | Resolved: 2023-10-16

## (undated) DEVICE — NDL HYPO 22GX1 1/2 SYR 10ML LL

## (undated) DEVICE — IRRIGATOR HYDRO-SURG PLUS 5MM

## (undated) DEVICE — KIT SURGICAL TURNOVER

## (undated) DEVICE — WARMER DRAPE STERILE LF

## (undated) DEVICE — APPLIER CLIP ENDO LIGAMAX 5MM

## (undated) DEVICE — SUT VICRYL+ 27 UR-6 VIOL

## (undated) DEVICE — NDL INSUF ULTRA VERESS 120MM

## (undated) DEVICE — SCISSOR CURVED ENDOPATH 5MM

## (undated) DEVICE — DRAPE STERI LONG

## (undated) DEVICE — TROCAR ENDOPATH XCEL 5X100MM

## (undated) DEVICE — CANNULA ENDOPATH XCEL 5X100MM

## (undated) DEVICE — ELECTRODE PATIENT RETURN DISP

## (undated) DEVICE — GLOVE PROTEXIS LTX MICRO  7.5

## (undated) DEVICE — SUT VICRYL PLUS 4-0 FS-2 27IN

## (undated) DEVICE — TROCAR ENDOPATH XCEL 11MM 10CM

## (undated) DEVICE — ELECTRODE MEGADYNE L-WIRE 33CM

## (undated) DEVICE — CORD LAP 10 DISP

## (undated) DEVICE — CLOSURE SKIN STERI STRIP 1/2X4

## (undated) DEVICE — SOL IRRI STRL WATER 1000ML

## (undated) DEVICE — CHLORAPREP W TINT 26ML APPL

## (undated) DEVICE — KIT GEN LAPAROSCOPY LAFAYETTE